# Patient Record
Sex: MALE | Race: WHITE | Employment: OTHER | URBAN - METROPOLITAN AREA
[De-identification: names, ages, dates, MRNs, and addresses within clinical notes are randomized per-mention and may not be internally consistent; named-entity substitution may affect disease eponyms.]

---

## 2017-01-26 RX ORDER — CLOPIDOGREL BISULFATE 75 MG/1
75 TABLET ORAL DAILY
Qty: 90 TAB | Refills: 3 | Status: SHIPPED | OUTPATIENT
Start: 2017-01-26 | End: 2018-02-15 | Stop reason: SDUPTHER

## 2017-01-26 RX ORDER — DESMOPRESSIN ACETATE 0.2 MG/1
0.2 TABLET ORAL AS NEEDED
Qty: 30 TAB | Refills: 3 | Status: SHIPPED | OUTPATIENT
Start: 2017-01-26 | End: 2017-02-23 | Stop reason: SDUPTHER

## 2017-02-16 RX ORDER — AZITHROMYCIN 250 MG/1
TABLET, FILM COATED ORAL
Qty: 6 TAB | Refills: 0 | Status: SHIPPED | OUTPATIENT
Start: 2017-02-16 | End: 2017-07-18 | Stop reason: ALTCHOICE

## 2017-02-23 ENCOUNTER — OFFICE VISIT (OUTPATIENT)
Dept: CARDIOLOGY CLINIC | Age: 72
End: 2017-02-23

## 2017-02-23 ENCOUNTER — CLINICAL SUPPORT (OUTPATIENT)
Dept: CARDIOLOGY CLINIC | Age: 72
End: 2017-02-23

## 2017-02-23 VITALS
HEART RATE: 79 BPM | WEIGHT: 240 LBS | DIASTOLIC BLOOD PRESSURE: 104 MMHG | HEIGHT: 71 IN | OXYGEN SATURATION: 96 % | SYSTOLIC BLOOD PRESSURE: 140 MMHG | BODY MASS INDEX: 33.6 KG/M2

## 2017-02-23 DIAGNOSIS — Z95.0 S/P BIVENTRICULAR CARDIAC PACEMAKER PROCEDURE: ICD-10-CM

## 2017-02-23 DIAGNOSIS — I25.10 ATHEROSCLEROSIS OF NATIVE CORONARY ARTERY OF NATIVE HEART WITHOUT ANGINA PECTORIS: Primary | ICD-10-CM

## 2017-02-23 DIAGNOSIS — E78.49 OTHER HYPERLIPIDEMIA: ICD-10-CM

## 2017-02-23 DIAGNOSIS — I44.2 THIRD DEGREE AV BLOCK (HCC): Primary | ICD-10-CM

## 2017-02-23 DIAGNOSIS — Z95.0 CARDIAC PACEMAKER IN SITU: ICD-10-CM

## 2017-02-23 NOTE — MR AVS SNAPSHOT
Visit Information Date & Time Provider Department Dept. Phone Encounter #  
 2/23/2017  1:00 PM Aliyah Peng MD [facility] [address] [id number] Your Appointments 5/25/2017  1:20 PM  
Follow Up with Aliyah Peng MD  
[facility] ([facility]) Appt Note: 3 mth f/up with Dr. Tonie Reagan [phone] [address] [address] [phone] [facility] [address] Upcoming Health Maintenance Date Due DTaP/Tdap/Td series (1 - Tdap) 10/31/1964 FOBT Q 1 YEAR AGE 50-75 10/31/1993 ZOSTER VACCINE AGE 60> 10/31/2003 GLAUCOMA SCREENING Q2Y 10/31/2008 MEDICARE YEARLY EXAM 10/31/2008 Pneumococcal 65+ Low/Medium Risk (2 of 2 - PPSV23) 1/1/2011 INFLUENZA AGE 9 TO ADULT 8/1/2016 Allergies as of 2/23/2017  Review Complete On: 3/21/2015 By: Aliyah Peng MD  
 No Known Allergies Current Immunizations  Reviewed on 12/17/2013 Name Date Pneumococcal Vaccine (Unspecified Type) 1/1/2006 Not reviewed this visit You Were Diagnosed With   
  
 Codes Comments Atherosclerosis of native coronary artery of native heart without angina pectoris    -  Primary ICD-10-CM: I25.10 ICD-9-CM: 414.01 Vitals BP  
  
  
  
  
  
 (!) 140/104 (BP 1 Location: Right arm, BP Patient Position: Sitting) BMI and BSA Data Body Mass Index Body Surface Area  
 33.47 kg/m 2 2.34 m 2 Preferred Pharmacy Pharmacy Name Phone [facility] [place], [facility] Antoni Cullene. [phone] Your Updated Medication List  
  
   
This list is accurate as of: 2/23/17  2:20 PM.  Always use your most recent med list.  
  
  
  
  
 ASCORBYL PALMITATE (BULK)  
by Does Not Apply route. aspirin 81 mg tablet Take 162 mg by mouth daily. azithromycin 250 mg tablet Commonly known as:  [place]ie 2 tabs PO now then 1 tab daily for 4 days Cholecalciferol (Vitamin D3) 3,000 unit Tab Take 400 Units by mouth daily. clopidogrel 75 mg Tab Commonly known as:  PLAVIX Take 1 Tab by mouth daily. COQ10  100-100 mg-unit Cap Generic drug:  coenzyme q10-vitamin e Take  by mouth two (2) times a day. CURCUMIN Take 1 Cap by mouth two (2) times a day. * desmopressin 0.1 mg tablet Commonly known as:  DDAVP Take 1 tablet by mouth daily. * desmopressin 0.2 mg tablet Commonly known as:  DDAVP Take 1 Tab by mouth as needed. As needed during trips   Indications: PARTIAL CENTRAL DIABETES INSIPIDUS  
  
 DHEA PO Take 50 mg by mouth daily. HYDROcodone-acetaminophen 5-325 mg per tablet Commonly known as:  Mark Bent Take 1 Tab by mouth every six (6) hours as needed for Pain.  
  
 melatonin 3 mg tablet Take 3 mg by mouth nightly. Omega-3-DHA-EPA-Fish Oil 1,000 mg (120 mg-180 mg) Cap Take  by mouth daily. SELENIUM PO Take 1 Cap by mouth every Monday, Wednesday, Friday. * testosterone 30 mg/actuation (1.5 mL) Slpm  
Commonly known as:  AXIRON  
2 Actuation(s) by TransDERmal route daily. * testosterone 4 mg/24 hr Pt24 Commonly known as:  ANDRODERM 1 patch by TransDERmal route daily. VITAMIN C 500 mg tablet Generic drug:  ascorbic acid (vitamin C) Take 500 mg by mouth daily. vitamin e 400 unit Tab Take  by mouth daily. * Notice: This list has 4 medication(s) that are the same as other medications prescribed for you. Read the directions carefully, and ask your doctor or other care provider to review them with you. We Performed the Following AMB POC EKG ROUTINE W/ 12 LEADS, INTER & REP [02825 CPT(R)] To-Do List   
 02/23/2017 Lab:  LIPID PANEL   
  
 02/23/2017 Lab:  METABOLIC PANEL, [facility] & HEALTH SERVICES! Dear Goyo Guerrero: Thank you for requesting a Storone account. Our records indicate that you already have an active Storone account. You can access your account anytime at [email or web address] of the Elements. WalkHub/Children of the Elements Did you know that you can access your hospital and ER discharge instructions at any time in Storone? You can also review all of your test results from your hospital stay or ER visit. Additional Information If you have questions, please visit the Frequently Asked Questions section of the Storone website at [email or web address] [email or web address] [employer] [email or web address] WalkHub/Children of the Elements/. Remember, Storone is NOT to be used for urgent needs. For medical emergencies, dial 911. Now available from your iPhone and Android! Please provide this summary of care documentation to your next provider. Your primary care clinician is listed as Jennifer MCWILLIAMS. If you have any questions after today's visit, please call [phone].

## 2017-02-23 NOTE — PROGRESS NOTES
1. Have you been to the ER, urgent care clinic since your last visit? Hospitalized since your last visit? No    2. Have you seen or consulted any other health care providers outside of the [address] [place] [address] since your last visit? Include any pap smears or colon screening.  No    Verbal order and read back per Yvonne Martínez MD

## 2017-02-24 RX ORDER — DESMOPRESSIN ACETATE 0.2 MG/1
0.2 TABLET ORAL AS NEEDED
Qty: 30 TAB | Refills: 3 | Status: SHIPPED | OUTPATIENT
Start: 2017-02-24 | End: 2017-04-21 | Stop reason: DRUGHIGH

## 2017-02-24 NOTE — PROGRESS NOTES
I have personally seen and evaluated the device findings. Interrogation reviewed and I agree with assessment.     Ayanna Chandra

## 2017-02-24 NOTE — PROGRESS NOTES
PATIENT NAME: Rosanne Masters         66 y.o.      1945              DATE:2/23/2017    REASON FOR VISIT: Coronary artery disease    HISTORY OF PRESENT ILLNESS:Denies chest pain and other symptoms suggestive of angina. Denies MUSA, PND, orthopnea. Denies palpitation, syncope, presyncope. Denies edema in the lower extremities. 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 act*  No date: Arthritis  7/13/2013: Ataxia  No date: Ataxia, late effect of cerebrovascular disease  No date: CAD (coronary artery disease)  7/31/2013: Carotid artery disease ([address]  7/13/2013: Carotid bruit  No date: Cognitive deficits, late effect of cerebrovasc*  6/14/2013: Complete heart block ([address]  6/14/2013: Complete heart block ([address]  6/14/2013: Coronary atherosclerosis of native coronary ar*  2014: Fractured rib  2/20/2014: Hypogonadism male  7/13/2013: Normal pressure hydrocephalus  No date: Other testicular hypofunction  No date: Pacemaker  7/29/2013: Pacemaker malfunction  7/13/2013: Peripheral neuropathy ([address]  7/13/2013: S/P ventriculoperitoneal shunt  12/16/2013:  Third degree AV block ([address]  7/31/2013: Transient ischemic attack  7/31/2013: Transient ischemic attack  No date: Unspecified disorder of muscle, ligament, and *  No date: Unspecified hereditary and idiopathic peripher*  No date: Unspecified intestinal malabsorption    PAST SURGICAL HISTORY:   Past Surgical History:  10/2002: BRAIN SHUNT TUBE/RESERV INJECTN      Comment: [facility]  No date: HX APPENDECTOMY      Comment: [age] years old  2003: HX CORONARY ARTERY BYPASS GRAFT      Comment: Dr. Elijah Pacheco  2008: HX HERNIA REPAIR      Comment: abdominal a couple   No date: HX PACEMAKER  12/16/2013: [facility] REMOVAL PERMANENT PACEMAKER PULSE GENERATOR*      Comment:    12/16/2013: REMOVAL PACER LEADS DUAL      Comment:        SOCIAL HISTORY:  Social History    Marital status:             Spouse name: Years of education:                 Number of children:               Social History Main Topics    Smoking status: Never Smoker                                                                Smokeless status: Never Used                        Alcohol use: Yes           7.0 oz/week    Drug use: No              Sexual activity: Not Currently        Social History Narrative    ** Merged History Encounter **             ALLERGIES:   No Known Allergies     CURRENT MEDICATIONS:   Current Outpatient Prescriptions:  desmopressin (DDAVP) 0.2 mg tablet, Take 1 Tab by mouth as needed. As needed during trips   Indications: PARTIAL CENTRAL DIABETES INSIPIDUS  azithromycin (ZITHROMAX) 250 mg tablet, 2 tabs PO now then 1 tab daily for 4 days  clopidogrel (PLAVIX) 75 mg tab, Take 1 Tab by mouth daily. Cholecalciferol, Vitamin D3, 3,000 unit tab, Take 400 Units by mouth daily. desmopressin (DDAVP) 0.1 mg tablet, Take 1 tablet by mouth daily. testosterone (ANDRODERM) 4 mg/24 hr pt24, 1 patch by TransDERmal route daily. testosterone (AXIRON) 30 mg/1.5 mL /actuation slpm, 2 Actuation(s) by TransDERmal route daily. HYDROcodone-acetaminophen (NORCO) 5-325 mg per tablet, Take 1 Tab by mouth every six (6) hours as needed for Pain.  vitamin e 400 unit Tab, Take  by mouth daily. aspirin 81 mg tablet, Take 162 mg by mouth daily. Omega-3-DHA-EPA-Fish Oil 1,000 (120-180) mg Cap, Take  by mouth daily. melatonin 3 mg tablet, Take 3 mg by mouth nightly. ascorbic acid (VITAMIN C) 500 mg tablet, Take 500 mg by mouth daily. coenzyme q10-vitamin e (COQ10 ) 100-100 mg-unit Cap, Take  by mouth two (2) times a day. SELENIUM PO, Take 1 Cap by mouth every Monday, Wednesday, Friday. ASCORBYL PALMITATE, BULK,, by Does Not Apply route. PRASTERONE, DHEA, (DHEA PO), Take 50 mg by mouth daily. TURMERIC (CURCUMIN), Take 1 Cap by mouth two (2) times a day. No current facility-administered medications for this visit.        REVIEW of SYSTEMS:History obtained from chart review and the patient  General ROS: negative for - weight gain or weight loss  Respiratory ROS: Please see history of present illness  Cardiovascular ROS: Please see history of present illness     PHYSICAL EXAMINATION:   BP (!) 140/104 (BP 1 Location: Right arm, BP Patient Position: Sitting)  Pulse 79  Ht 5' 11\" (1.803 m)  Wt 108.9 kg (240 lb)  SpO2 96%  BMI 33.47 kg/m2  BP Readings from Last 3 Encounters:  02/23/17 : (!) 140/104  11/25/16 : 126/86  03/02/15 : 137/90    Pulse Readings from Last 3 Encounters:  02/23/17 : 79  11/25/16 : 80  03/02/15 : 85    Wt Readings from Last 3 Encounters:  02/23/17 : 108.9 kg (240 lb)  11/25/16 : 105.7 kg (233 lb)  03/02/15 : 102.1 kg (225 lb)    Neck: No jugular venous distention. No bruits. Chest: Clear to auscultation. Heart: Regular rhythm. No gallop audible. Extremities: Trace edema    PACEMAKER INTERROGATION: Normal function. Good battery life    IMPRESSION:   Coronary artery disease, asymptomatic status post coronary artery bypass surgery  Hyperlipidemia  Normally functioning pacemaker    PLAN:  Lipid profile. Comprehensive metabolic profile    The diagnoses and plan were discussed with patient. All questions answered. Plan of care agreed to by all concerned. Alison Reddy.  MD Aleshia       ,

## 2017-04-21 ENCOUNTER — OFFICE VISIT (OUTPATIENT)
Dept: CARDIOLOGY CLINIC | Age: 72
End: 2017-04-21

## 2017-04-21 DIAGNOSIS — G91.2 NORMAL PRESSURE HYDROCEPHALUS (HCC): ICD-10-CM

## 2017-04-21 DIAGNOSIS — I77.9 CAROTID ARTERY DISEASE, UNSPECIFIED LATERALITY (HCC): ICD-10-CM

## 2017-04-21 DIAGNOSIS — I25.10 ATHEROSCLEROSIS OF NATIVE CORONARY ARTERY OF NATIVE HEART WITHOUT ANGINA PECTORIS: ICD-10-CM

## 2017-04-21 RX ORDER — DESMOPRESSIN ACETATE 0.2 MG/1
0.2 TABLET ORAL
Qty: 90 TAB | Refills: 3 | Status: SHIPPED | OUTPATIENT
Start: 2017-04-21 | End: 2018-02-12

## 2017-04-21 NOTE — MR AVS SNAPSHOT
Visit Information Date & Time Provider Department Dept. Phone Encounter #  
 4/21/2017  8:40 AM Alyssa Schmitt MD [facility] [address] [phone] Your Appointments 8/3/2017  2:00 PM  
Follow Up with Alyssa Schmitt MD  
[facility] [address] ([address]) [place] [phone] [address] [address] [phone] [address] [place] [address] [address] Upcoming Health Maintenance Date Due DTaP/Tdap/Td series (1 - Tdap) 10/31/1964 FOBT Q 1 YEAR AGE 50-75 10/31/1993 ZOSTER VACCINE AGE 60> 10/31/2003 GLAUCOMA SCREENING Q2Y 10/31/2008 MEDICARE YEARLY EXAM 10/31/2008 Pneumococcal 65+ Low/Medium Risk (2 of 2 - PPSV23) 1/1/2011 INFLUENZA AGE 9 TO ADULT 8/1/2016 Allergies as of 4/21/2017  Review Complete On: 4/21/2017 By: Celeste Fernandes LPN No Known Allergies Current Immunizations  Reviewed on 12/17/2013 Name Date Pneumococcal Vaccine (Unspecified Type) 1/1/2006 Not reviewed this visit You Were Diagnosed With   
  
 Codes Comments H/O ventricular shunt    -  Primary ICD-10-CM: Z98.2 ICD-9-CM: V45.2 Carotid artery disease, unspecified laterality ([facility]     ICD-10-CM: I77.9 ICD-9-CM: 068. 9 Vitals Smoking Status Never Smoker Preferred Pharmacy Pharmacy Name Phone [facility] [place] [address] [address] [phone] Your Updated Medication List  
  
   
This list is accurate as of: 4/21/17 10:08 AM.  Always use your most recent med list.  
  
  
  
  
 ASCORBYL PALMITATE (BULK)  
by Does Not Apply route. aspirin 81 mg tablet Take 162 mg by mouth daily. azithromycin 250 mg tablet Commonly known as:  Mansi Brown 2 tabs PO now then 1 tab daily for 4 days Cholecalciferol (Vitamin D3) 3,000 unit Tab Take 400 Units by mouth daily. clopidogrel 75 mg Tab Commonly known as:  PLAVIX Take 1 Tab by mouth daily. COQ10  100-100 mg-unit Cap Generic drug:  coenzyme q10-vitamin e Take  by mouth two (2) times a day. CURCUMIN Take 1 Cap by mouth two (2) times a day. desmopressin 0.2 mg tablet Commonly known as:  DDAVP Take 1 Tab by mouth nightly. Indications: PARTIAL CENTRAL DIABETES INSIPIDUS  
  
 DHEA PO Take 50 mg by mouth daily. HYDROcodone-acetaminophen 5-325 mg per tablet Commonly known as:  Marlaine Rakesh Take 1 Tab by mouth every six (6) hours as needed for Pain.  
  
 melatonin 3 mg tablet Take 3 mg by mouth nightly. Omega-3-DHA-EPA-Fish Oil 1,000 mg (120 mg-180 mg) Cap Take  by mouth daily. SELENIUM PO Take 1 Cap by mouth every Monday, Wednesday, Friday. * testosterone 30 mg/actuation (1.5 mL) Slpm  
Commonly known as:  AXIRON  
2 Actuation(s) by TransDERmal route daily. * testosterone 4 mg/24 hr Pt24 Commonly known as:  ANDRODERM 1 patch by TransDERmal route daily. VITAMIN C 500 mg tablet Generic drug:  ascorbic acid (vitamin C) Take 500 mg by mouth daily. vitamin e 400 unit Tab Take  by mouth daily. * Notice: This list has 2 medication(s) that are the same as other medications prescribed for you. Read the directions carefully, and ask your doctor or other care provider to review them with you. Prescriptions Printed Refills  
 desmopressin (DDAVP) 0.2 mg tablet 3 Sig: Take 1 Tab by mouth nightly. Indications: PARTIAL CENTRAL DIABETES INSIPIDUS Class: Print Route: Oral  
  
We Performed the Following AMB POC EKG ROUTINE W/ 12 LEADS, INTER & REP [01594 CPT(R)] Introducing [facility] & HEALTH SERVICES! Dear Alex Dillon: Thank you for requesting a ZenMate account. Our records indicate that you already have an active ZenMate account.   You can access your account anytime at [email or web address]. [employer]ED [employer]FORMATION [employer]ITY/Sandlot Solutions Did you know that you can access your hospital and ER discharge instructions at any time in Clifton? You can also review all of your test results from your hospital stay or ER visit. Additional Information If you have questions, please visit the Frequently Asked Questions section of the Clifton website at [email or web address]. [employer]ED [employer]FORMATION [employer]URITY/[employer]/. Remember, Clifton is NOT to be used for urgent needs. For medical emergencies, dial 911. Now available from your iPhone and Android! Please provide this summary of care documentation to your next provider. Your primary care clinician is listed as [facility] MCWILLIAMS. If you have any questions after today's visit, please call [phone].

## 2017-04-24 NOTE — PROGRESS NOTES
The patient is here for a pacemaker check only. The pacemaker is functioning normally. He has requested a referral to a neurosurgeon at the [facility].   Dr. Cristian Dominguez.  I will arrange for the referral

## 2017-04-26 ENCOUNTER — TELEPHONE (OUTPATIENT)
Dept: CARDIOLOGY CLINIC | Age: 72
End: 2017-04-26

## 2017-04-26 NOTE — TELEPHONE ENCOUNTER
Patient is calling for records to be sent to Dr. Barney Mccall PH# [phone]. Dr. Scottie Helms is suppose to be referring him ?

## 2017-06-26 ENCOUNTER — TELEPHONE (OUTPATIENT)
Dept: CARDIOLOGY CLINIC | Age: 72
End: 2017-06-26

## 2017-06-26 NOTE — TELEPHONE ENCOUNTER
Patient scheduled appointment with the [facility] on the 14th July, not with cardiovascular specialists office.

## 2017-06-26 NOTE — TELEPHONE ENCOUNTER
Patients wants a referral for a vascular problems to [facility]. Patient has history of TIA. Patient scheduled appointment on 14th July.    [address] [phone]

## 2017-06-29 RX ORDER — AZITHROMYCIN 250 MG/1
250 TABLET, FILM COATED ORAL SEE ADMIN INSTRUCTIONS
Qty: 6 TAB | Refills: 0 | Status: SHIPPED | OUTPATIENT
Start: 2017-06-29 | End: 2017-07-04

## 2017-06-30 ENCOUNTER — TELEPHONE (OUTPATIENT)
Dept: CARDIOLOGY CLINIC | Age: 72
End: 2017-06-30

## 2017-06-30 NOTE — TELEPHONE ENCOUNTER
Office Visit     4/21/2017  [facility] Lb Naylor MD   Cardiology    H/O ventricular shunt +3 more   Dx    Cholesterol Problem   Reason for visit    Progress Notes      The patient is here for a pacemaker check only. The pacemaker is functioning normally. He has requested a referral to a neurosurgeon at the [facility].   Dr. Kimberly Sosa.  I will arrange for the referral

## 2017-06-30 NOTE — TELEPHONE ENCOUNTER
Last tie he was seen he was given referral to see neurosurgeon. Neurosurgeon wend over mri and advised him that it is not a spinal stenosis that they suspected, but vascular issue. He advised him to be seen by Sammy Gonzalez MD (vascular surgeon over at [address] [place] [address] ).    Patient already scheduled appointment with him on 13 th july

## 2017-07-10 ENCOUNTER — TELEPHONE (OUTPATIENT)
Dept: CARDIOLOGY CLINIC | Age: 72
End: 2017-07-10

## 2017-07-10 NOTE — TELEPHONE ENCOUNTER
Araceli Apple @ [facility] called requesting information on patient's testing for Carotid Stenosis and PVD. She stated that patient has an appointment coming up with them. I spoke with Imani Orr about this . Rubin Stoll It appears that the patient requested Dr. Mary Jo Maldonado to refer him to a Neurologist @ [facility]. When he saw that doctor . Rubin Stoll He stated that the patient's problem was vascular . Rubin Stoll Which led to the patient being scheduled with [facility]. Imani Orr stated that whatever testing was done will need to be obtained from the Neurologist up there. I called back and left a voicemail on Roseline's direct line with that information. Also told her that if she had any further questions for us or if she needed anything else from us . Rubin Stoll To please call us back. Roseline's contact #s are: PT#[phone]  SFO#[phone].

## 2017-07-18 PROBLEM — Z80.42 FAMILY HISTORY OF PROSTATE CANCER: Status: ACTIVE | Noted: 2017-07-18

## 2017-07-18 PROBLEM — E23.2 DIABETES INSIPIDUS (HCC): Status: ACTIVE | Noted: 2017-07-18

## 2017-08-03 ENCOUNTER — CLINICAL SUPPORT (OUTPATIENT)
Dept: CARDIOLOGY CLINIC | Age: 72
End: 2017-08-03

## 2017-08-03 ENCOUNTER — OFFICE VISIT (OUTPATIENT)
Dept: CARDIOLOGY CLINIC | Age: 72
End: 2017-08-03

## 2017-08-03 VITALS
HEART RATE: 73 BPM | DIASTOLIC BLOOD PRESSURE: 92 MMHG | SYSTOLIC BLOOD PRESSURE: 142 MMHG | WEIGHT: 244 LBS | OXYGEN SATURATION: 96 % | BODY MASS INDEX: 36.14 KG/M2 | HEIGHT: 69 IN

## 2017-08-03 DIAGNOSIS — Z95.0 CARDIAC PACEMAKER IN SITU: ICD-10-CM

## 2017-08-03 DIAGNOSIS — I25.10 ATHEROSCLEROSIS OF NATIVE CORONARY ARTERY OF NATIVE HEART WITHOUT ANGINA PECTORIS: Primary | ICD-10-CM

## 2017-08-03 DIAGNOSIS — I44.2 THIRD DEGREE AV BLOCK (HCC): Primary | ICD-10-CM

## 2017-08-03 NOTE — PATIENT INSTRUCTIONS
Continue current medications. No changes.    If you have any further questions or concerns, please contact our office. [phone]

## 2017-08-03 NOTE — MR AVS SNAPSHOT
Visit Information Date & Time Provider Department Dept. Phone Encounter #  
 8/3/2017  2:00 PM Gael Rogers MD [facility] \[facility]\"" [phone] Upcoming Health Maintenance Date Due DTaP/Tdap/Td series (1 - Tdap) 10/31/1964 FOBT Q 1 YEAR AGE 50-75 10/31/1993 ZOSTER VACCINE AGE 60> 8/31/2003 GLAUCOMA SCREENING Q2Y 10/31/2008 MEDICARE YEARLY EXAM 10/31/2008 Pneumococcal 65+ Low/Medium Risk (2 of 2 - PPSV23) 1/1/2011 INFLUENZA AGE 9 TO ADULT 8/1/2017 Allergies as of 8/3/2017  Review Complete On: 8/3/2017 By: Rupal Kerns No Known Allergies Current Immunizations  Reviewed on 12/17/2013 Name Date Pneumococcal Vaccine (Unspecified Type) 1/1/2006 Not reviewed this visit You Were Diagnosed With   
  
 Codes Comments Atherosclerosis of native coronary artery of native heart without angina pectoris    -  Primary ICD-10-CM: I25.10 ICD-9-CM: 414.01 Vitals BP Pulse Height(growth percentile) Weight(growth percentile) SpO2 BMI  
 (!) 142/92 (BP 1 Location: Left arm, BP Patient Position: Sitting) 73 5' 9\" (1.753 m) 244 lb (110.7 kg) 96% 36.03 kg/m2 Smoking Status Never Smoker Vitals History BMI and BSA Data Body Mass Index Body Surface Area 36.03 kg/m 2 2.32 m 2 Preferred Pharmacy Pharmacy Name Phone [facility] [place] Antoni Bermane. [phone] Your Updated Medication List  
  
   
This list is accurate as of: 8/3/17  3:10 PM.  Always use your most recent med list.  
  
  
  
  
 ASCORBYL PALMITATE (BULK)  
by Does Not Apply route. aspirin 81 mg tablet Take 162 mg by mouth daily. Cholecalciferol (Vitamin D3) 3,000 unit Tab Take 400 Units by mouth daily. clopidogrel 75 mg Tab Commonly known as:  PLAVIX Take 1 Tab by mouth daily. COQ10  100-100 mg-unit Cap Generic drug:  coenzyme q10-vitamin e Take  by mouth two (2) times a day. CURCUMIN Take 1 Cap by mouth two (2) times a day. desmopressin 0.2 mg tablet Commonly known as:  DDAVP Take 1 Tab by mouth nightly. Indications: PARTIAL CENTRAL DIABETES INSIPIDUS  
  
 DHEA PO Take 50 mg by mouth daily. melatonin 3 mg tablet Take 3 mg by mouth nightly. Omega-3-DHA-EPA-Fish Oil 1,000 mg (120 mg-180 mg) Cap Take  by mouth daily. SELENIUM PO Take 1 Cap by mouth every Monday, Wednesday, Friday. * testosterone 30 mg/actuation (1.5 mL) Slpm  
Commonly known as:  AXIRON  
2 Actuation(s) by TransDERmal route daily. * testosterone 20.25 mg/1.25 gram (1.62 %) gel Commonly known as:  ANDROGEL Apply 1 Spray to affected area daily. Max Daily Amount: 20.25 mg. 2 pumps daily VITAMIN C 500 mg tablet Generic drug:  ascorbic acid (vitamin C) Take 500 mg by mouth daily. vitamin e 400 unit Tab Take  by mouth daily. * Notice: This list has 2 medication(s) that are the same as other medications prescribed for you. Read the directions carefully, and ask your doctor or other care provider to review them with you. We Performed the Following AMB POC EKG ROUTINE W/ 12 LEADS, INTER & REP [45457 CPT(R)] Patient Instructions Continue current medications. No changes. If you have any further questions or concerns, please contact our office. [phone] \[facility]\"" & HEALTH SERVICES! Dear Fer Ny: Thank you for requesting a Salesforce account. Our records indicate that you already have an active Salesforce account. You can access your account anytime at [email or web address]. [employer] Life Electronic Cigarette/Kotak Urja Did you know that you can access your hospital and ER discharge instructions at any time in Salesforce? You can also review all of your test results from your hospital stay or ER visit. Additional Information If you have questions, please visit the Frequently Asked Questions section of the [employer]hart website at [email or web address]. [email or web address] Remember, [employer] is NOT to be used for urgent needs. For medical emergencies, dial 911. Now available from your iPhone and Android! Please provide this summary of care documentation to your next provider. Your primary care clinician is listed as Mandy MCWILLIAMS. If you have any questions after today's visit, please call [phone].

## 2017-08-03 NOTE — PROGRESS NOTES
1. Have you been to the ER, urgent care clinic since your last visit? Hospitalized since your last visit? [facility] vascular surgeon consult   2. Have you seen or consulted any other health care providers outside of the [address] [place] [address] since your last visit? Include any pap smears or colon screening.   no

## 2017-08-04 NOTE — PROGRESS NOTES
PATIENT NAME: Alida Mccauley         66 y.o.      1945              DATE:8/3/2017    REASON FOR VISIT: Coronary artery disease, pacemaker    HISTORY OF PRESENT ILLNESS:Denies chest pain and other symptoms suggestive of angina. Denies MUSA, PND, orthopnea. Denies palpitation, syncope, presyncope. Denies edema in the lower extremities. 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 act*  No date: Arthritis  7/13/2013: Ataxia  No date: Ataxia, late effect of cerebrovascular disease  No date: CAD (coronary artery disease)  7/31/2013: Carotid artery disease ([address]  7/13/2013: Carotid bruit  No date: Cognitive deficits, late effect of cerebrovasc*  6/14/2013: Complete heart block ([address]  6/14/2013: Complete heart block ([address]  6/14/2013: Coronary atherosclerosis of native coronary ar*  7/18/2017: Diabetes insipidus ([address]  2014: Fractured rib  2/20/2014: Hypogonadism male  7/13/2013: Normal pressure hydrocephalus  No date: Other testicular hypofunction  No date: Pacemaker  7/29/2013: Pacemaker malfunction  7/13/2013: Peripheral neuropathy ([address]  7/13/2013: S/P ventriculoperitoneal shunt  12/16/2013:  Third degree AV block ([address]  7/31/2013: Transient ischemic attack  7/31/2013: Transient ischemic attack  No date: Unspecified disorder of muscle, ligament, and *  No date: Unspecified hereditary and idiopathic peripher*  No date: Unspecified intestinal malabsorption    PAST SURGICAL HISTORY:   Past Surgical History:  10/2002: BRAIN SHUNT TUBE/RESERV INJECTN      Comment: [facility]  No date: HX APPENDECTOMY      Comment: [age] years old  2003: HX CORONARY ARTERY BYPASS GRAFT      Comment: Dr. Debbie Hernandes  2008: HX HERNIA REPAIR      Comment: abdominal a couple   No date: HX PACEMAKER  12/16/2013: [place] REMOVAL PERMANENT PACEMAKER PULSE GENERATOR*      Comment:    12/16/2013: REMOVAL PACER LEADS DUAL      Comment:        SOCIAL HISTORY:  Social History    Marital status:             Spouse name:                       Years of education:                 Number of children:               Social History Main Topics    Smoking status: Never Smoker                                                                Smokeless status: Never Used                        Alcohol use: Yes           7.0 oz/week    Drug use: No              Sexual activity: Not Currently        Social History Narrative    ** Merged History Encounter **             ALLERGIES:   No Known Allergies     CURRENT MEDICATIONS:   Current Outpatient Prescriptions:  testosterone (ANDROGEL) 20.25 mg/1.25 gram (1.62 %) gel, Apply 1 Spray to affected area daily. Max Daily Amount: 20.25 mg. 2 pumps daily  desmopressin (DDAVP) 0.2 mg tablet, Take 1 Tab by mouth nightly. Indications: PARTIAL CENTRAL DIABETES INSIPIDUS  clopidogrel (PLAVIX) 75 mg tab, Take 1 Tab by mouth daily. Cholecalciferol, Vitamin D3, 3,000 unit tab, Take 400 Units by mouth daily. testosterone (AXIRON) 30 mg/1.5 mL /actuation slpm, 2 Actuation(s) by TransDERmal route daily. vitamin e 400 unit Tab, Take  by mouth daily. aspirin 81 mg tablet, Take 162 mg by mouth daily. Omega-3-DHA-EPA-Fish Oil 1,000 (120-180) mg Cap, Take  by mouth daily. melatonin 3 mg tablet, Take 3 mg by mouth nightly. ascorbic acid (VITAMIN C) 500 mg tablet, Take 500 mg by mouth daily. coenzyme q10-vitamin e (COQ10 ) 100-100 mg-unit Cap, Take  by mouth two (2) times a day. SELENIUM PO, Take 1 Cap by mouth every Monday, Wednesday, Friday. ASCORBYL PALMITATE, BULK,, by Does Not Apply route. PRASTERONE, DHEA, (DHEA PO), Take 50 mg by mouth daily. TURMERIC (CURCUMIN), Take 1 Cap by mouth two (2) times a day. No current facility-administered medications for this visit.        REVIEW of SYSTEMS:General ROS: 11 pounds weight gain since last visit  Cardiovascular: Please see history of present illness     PHYSICAL EXAMINATION:   BP (!) 142/92 (BP 1 Location: Left arm, BP Patient Position: Sitting)  Pulse 73  Ht 5' 9\" (1.753 m)  Wt 110.7 kg (244 lb)  SpO2 96%  BMI 36.03 kg/m2  BP Readings from Last 3 Encounters:  08/03/17 : (!) 142/92  07/18/17 : 130/72  02/23/17 : (!) 140/104    Pulse Readings from Last 3 Encounters:  08/03/17 : 73  02/23/17 : 79  11/25/16 : 80    Wt Readings from Last 3 Encounters:  08/03/17 : 110.7 kg (244 lb)  07/18/17 : 105.7 kg (233 lb)  02/23/17 : 108.9 kg (240 lb)    : Obese white male no apparent distress. Neck: No jugular venous distention. Chest: Clear to auscultation. Heart: Regular rhythm. No murmur or gallop. Extremities: 2-3+ edema left lower extremity 2+ edema right lower extremity    Medical interrogation: Normal function. No significant atrial high rates    IMPRESSION:   Coronary artery disease, asymptomatic status post coronary artery bypass surgery  Normally functioning permanent pacemaker    PLAN:  I have offered the patient a diuretic because of his weight gain and edema. He refuses. The diagnoses and plan were discussed with patient. All questions answered. Plan of care agreed to by all concerned. Naveed Payne.  MD Aleshia       ,

## 2017-08-06 NOTE — PROGRESS NOTES
I have personally seen and evaluated the device findings. Interrogation reviewed and I agree with assessment.     Aston Harrison

## 2017-09-26 ENCOUNTER — TELEPHONE (OUTPATIENT)
Dept: CARDIOLOGY CLINIC | Age: 72
End: 2017-09-26

## 2017-09-26 RX ORDER — DESMOPRESSIN ACETATE 0.2 MG/1
TABLET ORAL
Qty: 30 TAB | Refills: 3 | Status: SHIPPED | OUTPATIENT
Start: 2017-09-26 | End: 2018-02-08 | Stop reason: SDUPTHER

## 2017-09-26 NOTE — TELEPHONE ENCOUNTER
Patient calling in stating that he thinks he have otitis externa. patient is a retired doctor. Patient would like to know which ENT dr Phil Ko will recommend for him to go.

## 2017-09-27 NOTE — TELEPHONE ENCOUNTER
Left a message informing the patient the only ENT specialist Dr. Megan Blevins is aware of is in [place]. He was asked to call the office back if he has further questions.  Yaakov Grissom LPN

## 2017-12-06 ENCOUNTER — OFFICE VISIT (OUTPATIENT)
Dept: CARDIOLOGY CLINIC | Age: 72
End: 2017-12-06

## 2017-12-06 ENCOUNTER — HOSPITAL ENCOUNTER (OUTPATIENT)
Dept: LAB | Age: 72
Discharge: HOME | End: 2017-12-06
Payer: MEDICARE

## 2017-12-06 VITALS
SYSTOLIC BLOOD PRESSURE: 130 MMHG | HEART RATE: 61 BPM | OXYGEN SATURATION: 98 % | HEIGHT: 69 IN | WEIGHT: 241 LBS | BODY MASS INDEX: 35.7 KG/M2 | DIASTOLIC BLOOD PRESSURE: 86 MMHG

## 2017-12-06 DIAGNOSIS — I10 ESSENTIAL (PRIMARY) HYPERTENSION: ICD-10-CM

## 2017-12-06 DIAGNOSIS — I25.10 ATHEROSCLEROSIS OF NATIVE CORONARY ARTERY OF NATIVE HEART WITHOUT ANGINA PECTORIS: ICD-10-CM

## 2017-12-06 DIAGNOSIS — I73.9 PAD (PERIPHERAL ARTERY DISEASE) (HCC): ICD-10-CM

## 2017-12-06 DIAGNOSIS — R79.9 ABNORMAL FINDING OF BLOOD CHEMISTRY: ICD-10-CM

## 2017-12-06 DIAGNOSIS — I44.2 COMPLETE HEART BLOCK (HCC): Primary | ICD-10-CM

## 2017-12-06 DIAGNOSIS — E78.5 HYPERLIPIDEMIA, UNSPECIFIED HYPERLIPIDEMIA TYPE: ICD-10-CM

## 2017-12-06 LAB
ALBUMIN SERPL-MCNC: 3.9 G/DL (ref 3.4–5)
ALBUMIN/GLOB SERPL: 1.2 {RATIO} (ref 0.8–1.7)
ALP SERPL-CCNC: 92 U/L (ref 45–117)
ALT SERPL-CCNC: 31 U/L (ref 16–61)
ANION GAP SERPL CALC-SCNC: 10 MMOL/L (ref 3–18)
AST SERPL-CCNC: 20 U/L (ref 15–37)
BASOPHILS # BLD: 0 K/UL (ref 0–0.06)
BASOPHILS NFR BLD: 0 % (ref 0–2)
BILIRUB SERPL-MCNC: 0.5 MG/DL (ref 0.2–1)
BUN SERPL-MCNC: 17 MG/DL (ref 7–18)
BUN/CREAT SERPL: 13 (ref 12–20)
CALCIUM SERPL-MCNC: 9.3 MG/DL (ref 8.5–10.1)
CHLORIDE SERPL-SCNC: 104 MMOL/L (ref 100–108)
CHOLEST SERPL-MCNC: 184 MG/DL
CO2 SERPL-SCNC: 27 MMOL/L (ref 21–32)
CREAT SERPL-MCNC: 1.3 MG/DL (ref 0.6–1.3)
DIFFERENTIAL METHOD BLD: ABNORMAL
EOSINOPHIL # BLD: 0.1 K/UL (ref 0–0.4)
EOSINOPHIL NFR BLD: 1 % (ref 0–5)
ERYTHROCYTE [DISTWIDTH] IN BLOOD BY AUTOMATED COUNT: 13.8 % (ref 11.6–14.5)
GLOBULIN SER CALC-MCNC: 3.2 G/DL (ref 2–4)
GLUCOSE SERPL-MCNC: 112 MG/DL (ref 74–99)
HCT VFR BLD AUTO: 42.8 % (ref 36–48)
HDLC SERPL-MCNC: 41 MG/DL (ref 40–60)
HDLC SERPL: 4.5 {RATIO} (ref 0–5)
HGB BLD-MCNC: 14.5 G/DL (ref 13–16)
LDLC SERPL CALC-MCNC: 118.6 MG/DL (ref 0–100)
LIPID PROFILE,FLP: ABNORMAL
LYMPHOCYTES # BLD: 1.7 K/UL (ref 0.9–3.6)
LYMPHOCYTES NFR BLD: 21 % (ref 21–52)
MCH RBC QN AUTO: 29.4 PG (ref 24–34)
MCHC RBC AUTO-ENTMCNC: 33.9 G/DL (ref 31–37)
MCV RBC AUTO: 86.8 FL (ref 74–97)
MONOCYTES # BLD: 0.6 K/UL (ref 0.05–1.2)
MONOCYTES NFR BLD: 8 % (ref 3–10)
NEUTS SEG # BLD: 5.5 K/UL (ref 1.8–8)
NEUTS SEG NFR BLD: 70 % (ref 40–73)
PLATELET # BLD AUTO: 219 K/UL (ref 135–420)
PMV BLD AUTO: 9.1 FL (ref 9.2–11.8)
POTASSIUM SERPL-SCNC: 4.7 MMOL/L (ref 3.5–5.5)
PROT SERPL-MCNC: 7.1 G/DL (ref 6.4–8.2)
RBC # BLD AUTO: 4.93 M/UL (ref 4.7–5.5)
SODIUM SERPL-SCNC: 141 MMOL/L (ref 136–145)
T4 SERPL-MCNC: 7.9 UG/DL (ref 4.5–10.9)
TRIGL SERPL-MCNC: 122 MG/DL (ref ?–150)
VLDLC SERPL CALC-MCNC: 24.4 MG/DL
WBC # BLD AUTO: 7.9 K/UL (ref 4.6–13.2)

## 2017-12-06 PROCEDURE — 80053 COMPREHEN METABOLIC PANEL: CPT

## 2017-12-06 PROCEDURE — 84436 ASSAY OF TOTAL THYROXINE: CPT

## 2017-12-06 PROCEDURE — 85025 COMPLETE CBC W/AUTO DIFF WBC: CPT

## 2017-12-06 PROCEDURE — 36415 COLL VENOUS BLD VENIPUNCTURE: CPT

## 2017-12-06 PROCEDURE — 80061 LIPID PANEL: CPT

## 2017-12-06 NOTE — PROGRESS NOTES
1. Have you been to the ER, urgent care clinic since your last visit? Hospitalized since your last visit? No     2. Have you seen or consulted any other health care providers outside of the [address] [place] [address] since your last visit? Include any pap smears or colon screening.  No

## 2017-12-06 NOTE — MR AVS SNAPSHOT
Visit Information Date & Time Provider Department Dept. Phone Encounter #  
 12/6/2017 11:20 AM Naa Austin MD [facility] [id number] Your Appointments 12/26/2017  9:45 AM  
PROCEDURE with Mandeep Dale MD  
[facility] ([facility]) Appt Note: Cystoscopy per Lrv  
 [address], [address] [facility]  
  
   
 [address], [place] [address] [address]  
  
    
 2/8/2018  1:00 PM  
PROCEDURE with Pacer Hv [facility] [address] ([facility]) Appt Note: 6 month check [place] [address] [address] [address] [phone] [address] [facility] [address] Upcoming Health Maintenance Date Due DTaP/Tdap/Td series (1 - Tdap) 10/31/1964 FOBT Q 1 YEAR AGE 50-75 10/31/1993 ZOSTER VACCINE AGE 60> 8/31/2003 GLAUCOMA SCREENING Q2Y 10/31/2008 MEDICARE YEARLY EXAM 10/31/2008 Pneumococcal 65+ Low/Medium Risk (2 of 2 - PPSV23) 1/1/2011 Influenza Age 5 to Adult 8/1/2017 Allergies as of 12/6/2017  Review Complete On: 8/3/2017 By: Todd Hummel No Known Allergies Current Immunizations  Reviewed on 12/17/2013 Name Date Pneumococcal Vaccine (Unspecified Type) 1/1/2006 Not reviewed this visit You Were Diagnosed With   
  
 Codes Comments Complete heart block (HCC)    -  Primary ICD-10-CM: Y81.5 ICD-9-CM: 426.0 Atherosclerosis of native coronary artery of native heart without angina pectoris     ICD-10-CM: I25.10 ICD-9-CM: 414.01 Hyperlipidemia, unspecified hyperlipidemia type     ICD-10-CM: E78.5 ICD-9-CM: 272.4 PAD (peripheral artery disease) (HCC)     ICD-10-CM: I73.9 ICD-9-CM: 443. 9 Abnormal finding of blood chemistry     ICD-10-CM: R79.9 ICD-9-CM: 790.6 Essential (primary) hypertension     ICD-10-CM: I10 
ICD-9-CM: 401.9 Vitals BP Pulse Height(growth percentile) Weight(growth percentile) SpO2 BMI  
 130/86 61 5' 9\" (1.753 m) 241 lb (109.3 kg) 98% 35.59 kg/m2 Smoking Status Never Smoker Vitals History BMI and BSA Data Body Mass Index Body Surface Area 35.59 kg/m 2 2.31 m 2 Preferred Pharmacy Pharmacy Name Phone [facility] [place], [address]. [phone] Your Updated Medication List  
  
   
This list is accurate as of: 12/6/17 12:38 PM.  Always use your most recent med list.  
  
  
  
  
 ASCORBYL PALMITATE (BULK)  
by Does Not Apply route. aspirin 81 mg tablet Take 162 mg by mouth daily. Cholecalciferol (Vitamin D3) 3,000 unit Tab Take 400 Units by mouth daily. clopidogrel 75 mg Tab Commonly known as:  PLAVIX Take 1 Tab by mouth daily. COQ10  100-100 mg-unit Cap Generic drug:  coenzyme q10-vitamin e Take  by mouth two (2) times a day. CURCUMIN Take 1 Cap by mouth two (2) times a day. * desmopressin 0.2 mg tablet Commonly known as:  DDAVP Take 1 Tab by mouth nightly. Indications: PARTIAL CENTRAL DIABETES INSIPIDUS  
  
 * desmopressin 0.2 mg tablet Commonly known as:  DDAVP TAKE ONE TABLET BY MOUTH AS NEEDED DURING TRIPS  
  
 DHEA PO Take 50 mg by mouth daily. melatonin 3 mg tablet Take 3 mg by mouth nightly. Omega-3-DHA-EPA-Fish Oil 1,000 mg (120 mg-180 mg) Cap Take  by mouth daily. SELENIUM PO Take 1 Cap by mouth every Monday, Wednesday, Friday. * testosterone 30 mg/actuation (1.5 mL) Slpm  
Commonly known as:  AXIRON  
2 Actuation(s) by TransDERmal route daily. * testosterone 20.25 mg/1.25 gram (1.62 %) gel Commonly known as:  ANDROGEL Apply 1 Spray to affected area daily. Max Daily Amount: 20.25 mg. 2 pumps daily VITAMIN C 500 mg tablet Generic drug:  ascorbic acid (vitamin C) Take 500 mg by mouth daily. vitamin e 400 unit Tab Take  by mouth daily. * Notice: This list has 4 medication(s) that are the same as other medications prescribed for you. Read the directions carefully, and ask your doctor or other care provider to review them with you. To-Do List   
 12/06/2017 Lab:  CBC WITH AUTOMATED DIFF   
  
 12/06/2017 Lab:  LIPID PANEL   
  
 12/06/2017 Lab:  METABOLIC PANEL, COMPREHENSIVE   
  
 12/06/2017 Lab:  T4 (THYROXINE) Introducing [address]! Dear Netta Bauer: Thank you for requesting a [employer] account. Our records indicate that you already have an active [employer] account. You can access your account anytime at [email or web address] Did you know that you can access your hospital and ER discharge instructions at any time in [employer]? You can also review all of your test results from your hospital stay or ER visit. Additional Information If you have questions, please visit the Frequently Asked Questions section of the [employer] website at [email or web address] Remember, [employer] is NOT to be used for urgent needs. For medical emergencies, dial 911. Now available from your iPhone and Android! Please provide this summary of care documentation to your next provider. Your primary care clinician is listed as Jonnathan MCWILLIAMS. If you have any questions after today's visit, please call [phone].

## 2017-12-14 NOTE — PROGRESS NOTES
PATIENT NAME: Juan Ramesh         74 y.o.      1945              DATE:12/6/2017    REASON FOR VISIT: Coronary artery disease    HISTORY OF PRESENT ILLNESS:Denies chest pain and other symptoms suggestive of angina. Denies MUSA, PND, orthopnea. Denies palpitation, syncope, presyncope. Denies edema in the lower extremities. 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 act*  No date: Arthritis  7/13/2013: Ataxia  No date: Ataxia, late effect of cerebrovascular disease  No date: CAD (coronary artery disease)  7/31/2013: Carotid artery disease ([address]  7/13/2013: Carotid bruit  No date: Cognitive deficits, late effect of cerebrovasc*  6/14/2013: Complete heart block ([address]  6/14/2013: Complete heart block ([address]  6/14/2013: Coronary atherosclerosis of native coronary ar*  7/18/2017: Diabetes insipidus ([address]  2014: Fractured rib  2/20/2014: Hypogonadism male  7/13/2013: Normal pressure hydrocephalus  No date: Other testicular hypofunction  No date: Pacemaker  7/29/2013: Pacemaker malfunction  7/13/2013: Peripheral neuropathy  7/13/2013: S/P ventriculoperitoneal shunt  12/16/2013:  Third degree AV block ([address]  7/31/2013: Transient ischemic attack  7/31/2013: Transient ischemic attack  No date: Unspecified disorder of muscle, ligament, and *  No date: Unspecified hereditary and idiopathic peripher*  No date: Unspecified intestinal malabsorption    PAST SURGICAL HISTORY:   Past Surgical History:  10/2002: BRAIN SHUNT TUBE/RESERV INJECTN      Comment: [facility]  No date: HX APPENDECTOMY      Comment: [age] years old  2003: HX CORONARY ARTERY BYPASS GRAFT      Comment: Dr. Claudean Haver  2008: HX HERNIA REPAIR      Comment: abdominal a couple   No date: HX PACEMAKER  12/16/2013: [place] REMOVAL PERMANENT PACEMAKER PULSE GENERATOR*      Comment: 12/16/2013: REMOVAL PACER LEADS DUAL      Comment:        SOCIAL HISTORY:  Social History    Marital status:              Spouse name:                       Years of education:                 Number of children:               Social History Main Topics    Smoking status: Never Smoker                                                                Smokeless status: Never Used                        Alcohol use: Yes           7.0 oz/week    Drug use: No              Sexual activity: Not Currently        Social History Narrative    ** Merged History Encounter **             ALLERGIES:   No Known Allergies     CURRENT MEDICATIONS:   Current Outpatient Prescriptions:  desmopressin (DDAVP) 0.2 mg tablet, TAKE ONE TABLET BY MOUTH AS NEEDED DURING TRIPS  testosterone (ANDROGEL) 20.25 mg/1.25 gram (1.62 %) gel, Apply 1 Spray to affected area daily. Max Daily Amount: 20.25 mg. 2 pumps daily  desmopressin (DDAVP) 0.2 mg tablet, Take 1 Tab by mouth nightly. Indications: PARTIAL CENTRAL DIABETES INSIPIDUS  clopidogrel (PLAVIX) 75 mg tab, Take 1 Tab by mouth daily. Cholecalciferol, Vitamin D3, 3,000 unit tab, Take 400 Units by mouth daily. testosterone (AXIRON) 30 mg/1.5 mL /actuation slpm, 2 Actuation(s) by TransDERmal route daily. vitamin e 400 unit Tab, Take  by mouth daily. aspirin 81 mg tablet, Take 162 mg by mouth daily. Omega-3-DHA-EPA-Fish Oil 1,000 (120-180) mg Cap, Take  by mouth daily. melatonin 3 mg tablet, Take 3 mg by mouth nightly. ascorbic acid (VITAMIN C) 500 mg tablet, Take 500 mg by mouth daily. coenzyme q10-vitamin e (COQ10 ) 100-100 mg-unit Cap, Take  by mouth two (2) times a day. SELENIUM PO, Take 1 Cap by mouth every Monday, Wednesday, Friday. ASCORBYL PALMITATE, BULK,, by Does Not Apply route. PRASTERONE, DHEA, (DHEA PO), Take 50 mg by mouth daily. TURMERIC (CURCUMIN), Take 1 Cap by mouth two (2) times a day. No current facility-administered medications for this visit. REVIEW of SYSTEMS:History obtained from the patient  General ROS: negative for - weight gain or weight loss  Cardiovascular ROS: See history of present illness     PHYSICAL EXAMINATION:   /86  Pulse 61  Ht 5' 9\" (1.753 m)  Wt 109.3 kg (241 lb)  SpO2 98%  BMI 35.59 kg/m2  BP Readings from Last 3 Encounters:  12/06/17 : 130/86  08/03/17 : (!) 142/92  07/18/17 : 130/72    Pulse Readings from Last 3 Encounters:  12/06/17 : 61  08/03/17 : 73  02/23/17 : 79    Wt Readings from Last 3 Encounters:  12/06/17 : 109.3 kg (241 lb)  08/03/17 : 110.7 kg (244 lb)  07/18/17 : 105.7 kg (233 lb)    HEENT: Sclera clear. Mucous membranes pink and moist.  Neck: No jugular venous distention. Carotid upstrokes 2+ without bruits. Chest: Clear to  auscultation. Heart: PMI not palpable. Regular rhythm. No murmur or gallop. Abdomen: Nontender without masses or organomegaly. Extremities: 1+ edema. Skin: Warm and dry. No stasis changes. Neuro: Alert, oriented, speech WNL, no facial asymmetry. Mraina Nat IMPRESSION:   Coronary artery disease, asymptomatic status post coronary artery bypass surgery  Hyperlipidemia    PLAN:  Lipid profile  Comprehensive metabolic profile  Return to office in 4 months    The diagnoses and plan were discussed with patient. All questions answered. Plan of care agreed to by all concerned. Oly Monk MD       ,

## 2017-12-18 RX ORDER — AZITHROMYCIN 250 MG/1
TABLET, FILM COATED ORAL
Qty: 6 TAB | Refills: 0 | OUTPATIENT
Start: 2017-12-18 | End: 2017-12-26 | Stop reason: ALTCHOICE

## 2017-12-26 PROBLEM — R31.0 GROSS HEMATURIA: Status: ACTIVE | Noted: 2017-12-26

## 2017-12-26 PROBLEM — N41.9 PROSTATITIS: Status: ACTIVE | Noted: 2017-12-26

## 2018-01-29 NOTE — PROGRESS NOTES
PT aware of NPO status. PT aware of need to hold anticoagulants per protocol. Holding plavix and asa. PT aware of potential for sedation administration and need for  at discharge. PT aware of arrival time pre procedure, 0800. Pt states no questions at this time.

## 2018-02-02 ENCOUNTER — HOSPITAL ENCOUNTER (OUTPATIENT)
Dept: GENERAL RADIOLOGY | Age: 73
Discharge: HOME | End: 2018-02-02
Attending: ORTHOPAEDIC SURGERY | Admitting: RADIOLOGY
Payer: MEDICARE

## 2018-02-02 ENCOUNTER — HOSPITAL ENCOUNTER (OUTPATIENT)
Dept: CT IMAGING | Age: 73
Discharge: HOME | End: 2018-02-02
Attending: ORTHOPAEDIC SURGERY
Payer: MEDICARE

## 2018-02-02 VITALS
HEIGHT: 71 IN | TEMPERATURE: 98.4 F | WEIGHT: 236 LBS | DIASTOLIC BLOOD PRESSURE: 80 MMHG | OXYGEN SATURATION: 96 % | HEART RATE: 66 BPM | RESPIRATION RATE: 16 BRPM | BODY MASS INDEX: 33.04 KG/M2 | SYSTOLIC BLOOD PRESSURE: 122 MMHG

## 2018-02-02 DIAGNOSIS — M54.2 CERVICALGIA: ICD-10-CM

## 2018-02-02 PROCEDURE — 74011000250 HC RX REV CODE- 250: Performed by: ORTHOPAEDIC SURGERY

## 2018-02-02 PROCEDURE — 62302 MYELOGRAPHY LUMBAR INJECTION: CPT

## 2018-02-02 PROCEDURE — 74011250637 HC RX REV CODE- 250/637: Performed by: RADIOLOGY

## 2018-02-02 PROCEDURE — 72126 CT NECK SPINE W/DYE: CPT

## 2018-02-02 PROCEDURE — 74011636320 HC RX REV CODE- 636/320: Performed by: ORTHOPAEDIC SURGERY

## 2018-02-02 RX ORDER — LIDOCAINE HYDROCHLORIDE 10 MG/ML
1-10 INJECTION, SOLUTION EPIDURAL; INFILTRATION; INTRACAUDAL; PERINEURAL
Status: COMPLETED | OUTPATIENT
Start: 2018-02-02 | End: 2018-02-02

## 2018-02-02 RX ORDER — ACETAMINOPHEN 325 MG/1
650 TABLET ORAL
Status: DISCONTINUED | OUTPATIENT
Start: 2018-02-02 | End: 2018-02-02 | Stop reason: HOSPADM

## 2018-02-02 RX ORDER — DIAZEPAM 5 MG/1
10 TABLET ORAL ONCE
Status: COMPLETED | OUTPATIENT
Start: 2018-02-02 | End: 2018-02-02

## 2018-02-02 RX ADMIN — DIAZEPAM 10 MG: 5 TABLET ORAL at 09:14

## 2018-02-02 RX ADMIN — IOPAMIDOL 12 ML: 612 INJECTION, SOLUTION INTRATHECAL at 10:20

## 2018-02-02 RX ADMIN — LIDOCAINE HYDROCHLORIDE 4 ML: 10 INJECTION, SOLUTION EPIDURAL; INFILTRATION; INTRACAUDAL; PERINEURAL at 10:20

## 2018-02-02 NOTE — DISCHARGE INSTRUCTIONS
DISCHARGE SUMMARY from Nurse    PATIENT INSTRUCTIONS:    After general anesthesia or intravenous sedation, for 24 hours or while taking prescription Narcotics:  · Limit your activities  · Do not drive and operate hazardous machinery  · Do not make important personal or business decisions  · Do  not drink alcoholic beverages  · If you have not urinated within 8 hours after discharge, please contact your surgeon on call. Report the following to your surgeon:  · Excessive pain, swelling, redness or odor of or around the surgical area  · Temperature over 100.5  · Nausea and vomiting lasting longer than 4 hours or if unable to take medications  · Any signs of decreased circulation or nerve impairment to extremity: change in color, persistent  numbness, tingling, coldness or increase pain  · Any questions    What to do at Home:  Recommended activity: Activity as tolerated and no driving for today,     *  Please give a list of your current medications to your Primary Care Provider. *  Please update this list whenever your medications are discontinued, doses are      changed, or new medications (including over-the-counter products) are added. *  Please carry medication information at all times in case of emergency situations. These are general instructions for a healthy lifestyle:    No smoking/ No tobacco products/ Avoid exposure to second hand smoke  Surgeon General's Warning:  Quitting smoking now greatly reduces serious risk to your health. Obesity, smoking, and sedentary lifestyle greatly increases your risk for illness    A healthy diet, regular physical exercise & weight monitoring are important for maintaining a healthy lifestyle    You may be retaining fluid if you have a history of heart failure or if you experience any of the following symptoms:  Weight gain of 3 pounds or more overnight or 5 pounds in a week, increased swelling in our hands or feet or shortness of breath while lying flat in bed. Please call your doctor as soon as you notice any of these symptoms; do not wait until your next office visit. Recognize signs and symptoms of STROKE:    F-face looks uneven    A-arms unable to move or move unevenly    S-speech slurred or non-existent    T-time-call 911 as soon as signs and symptoms begin-DO NOT go       Back to bed or wait to see if you get better-TIME IS BRAIN. Warning Signs of HEART ATTACK     Call 911 if you have these symptoms:   Chest discomfort. Most heart attacks involve discomfort in the center of the chest that lasts more than a few minutes, or that goes away and comes back. It can feel like uncomfortable pressure, squeezing, fullness, or pain.  Discomfort in other areas of the upper body. Symptoms can include pain or discomfort in one or both arms, the back, neck, jaw, or stomach.  Shortness of breath with or without chest discomfort.  Other signs may include breaking out in a cold sweat, nausea, or lightheadedness. Don't wait more than five minutes to call 911 - MINUTES MATTER! Fast action can save your life. Calling 911 is almost always the fastest way to get lifesaving treatment. Emergency Medical Services staff can begin treatment when they arrive -- up to an hour sooner than if someone gets to the hospital by car. The discharge information has been reviewed with the patient and spouse. The patient and spouse verbalized understanding. Discharge medications reviewed with the patient and spouse and appropriate educational materials and side effects teaching were provided. ___________________________________________________________________________________________________________________________________     Myelogram: About This Test  What is it? A myelogram uses X-rays and a special dye to make pictures of bones and nerves of the spine (spinal canal).  The spinal canal holds the spinal cord, the spinal nerve roots, and the fluid-filled space between the bones in your spine. Why is this test done? A myelogram is done to check for:  · The cause of arm or leg numbness, weakness, or pain. · Narrowing of the spinal canal (spinal stenosis). · A tumor or infection causing problems with the spinal cord or nerve roots. · A spinal disc that has ruptured (herniated disc). · Inflammation of the membrane that covers the brain and spinal cord. · Problems with the blood vessels to the spine. How can you prepare for the test?  Your doctor will tell you if you need to change how much you eat and drink before the myelogram. You may be asked to increase the amount of water you drink before the test. Follow the instructions your doctor gives you about eating and drinking. Before a myelogram, tell your doctor if:  · You are allergic to any medicines, contrast material, or iodine dye. · You have had bleeding problems, or you take a blood thinner, such as aspirin, clopidogrel (Plavix), or warfarin (Coumadin), or you take over-the-counter medicines, such as ibuprofen (Advil, Motrin) or naproxen (Aleve). Your doctor will tell you when you should stop taking these medicines several days before your procedure. Make sure that you understand exactly what he or she wants you to do. · You have had kidney problems. · You have diabetes, especially if you take metformin (Glucophage, for example). · You are or might be pregnant. Ask someone to take you home and stay with you after the test.  What happens during the test?  The dye is put into your spinal canal with a thin needle. This is called a lumbar puncture. The dye moves through the space so the nerve roots and spinal cord can be seen more clearly. After the dye is put in, you will lie still while the X-ray pictures are taken. How does it feel? You will feel a quick sting from a small needle that has medicine to numb the skin on your back.  You will also feel some pressure as the long, thin spinal needle is put into your spinal canal. You may feel a quick sharp pain down your buttock or leg when the needle is moved in your spine. The dye may make you feel warm and flushed and have a metallic taste in your mouth. What else should you know about the test?  In rare cases, the hole made by the needle in the sac around the spine does not close normally. This can allow spinal fluid to leak out. This leak may need to be repaired through a procedure called an epidural blood patch. To do the patch, your doctor injects some of your own blood to cover the hole. How long does the test take? · A myelogram usually takes 30 minutes to 1 hour. What happens after the test?  · You will probably be able to go home 30 minutes to 2 hours after the test.  · You may need to lie in bed with your head raised for 4 to 24 hours after the test. To prevent seizures, which are a rare side effect, do not bend over or lie down with your head lower than your body. Keeping your head higher than your body after a myelogram also may help prevent or reduce other side effects, such as headache, nausea, or vomiting. · Avoid strenuous activity, such as running or heavy lifting, for at least 1 day after your myelogram.  · Drink plenty of water after the myelogram. Your doctor will give you instructions on taking your regular medicines. When should you call for help? Call 911 anytime you think you may need emergency care. For example, call if:  · You have a seizure. Call your doctor now or seek immediate medical care if:  · You have any increase in pain, weakness, or numbness in your legs. · You have a severe headache or stiff neck, or your eyes become very sensitive to light. · You have a headache that lasts longer than 24 hours. · You have problems urinating or having a bowel movement. · You have a fever. Follow-up care is a key part of your treatment and safety. Be sure to make and go to all appointments, and call your doctor if you are having problems.  It's also a good idea to keep a list of the medicines you take. Ask your doctor when you can expect to have your test results. Where can you learn more? Go to http://reyes-igor.info/. Enter L625 in the search box to learn more about \"Myelogram: About This Test.\"  Current as of: October 14, 2016  Content Version: 11.4  © 3721-3720 [employer]. Care instructions adapted under license by [employer] (which disclaims liability or warranty for this information). If you have questions about a medical condition or this instruction, always ask your healthcare professional. [place] [address] any warranty or liability for your use of this information.

## 2018-02-02 NOTE — IP AVS SNAPSHOT
[address] [address] 
[phone] Patient: Lashay Maldonado MRN: [id number] YLY:[id number] About your hospitalization You were admitted on:  February 2, 2018 You last received care in the:  [address] You were discharged on:  February 2, 2018 Why you were hospitalized Your primary diagnosis was:  Not on File Follow-up Information Follow up With Details Comments Contact Info Tremaine Burns MD  as scheduled [address] [facility] and [address] [place] [address] 
[phone] Myah Carmen MD   [facility] [address] [facility] 
[phone] Your Scheduled Appointments Thursday February 08, 2018  1:00 PM EST PROCEDURE with [facility] ([facility]) [facility] [address] [address] [address] [phone] Discharge Orders None A check ed indicates which time of day the medication should be taken. My Medications ASK your doctor about these medications Instructions Each Dose to Equal  
 Morning Noon Evening Bedtime ASCORBYL PALMITATE (BULK) Your last dose was: Your next dose is:    
   
   
 by Does Not Apply route. aspirin 81 mg tablet Your last dose was: Your next dose is: Take 162 mg by mouth daily. 162 mg Cholecalciferol (Vitamin D3) 3,000 unit Tab Your last dose was: Your next dose is: Take 400 Units by mouth daily. 400 Units  
    
   
   
   
  
 clopidogrel 75 mg Tab Commonly known as:  PLAVIX Your last dose was: Your next dose is: Take 1 Tab by mouth daily. 75 mg  
    
   
   
   
  
 COQ10  100-100 mg-unit Cap Generic drug:  coenzyme q10-vitamin e Your last dose was: Your next dose is: Take  by mouth two (2) times a day. CURCUMIN Your last dose was: Your next dose is: Take 1 Cap by mouth two (2) times a day. 1 Cap * desmopressin 0.2 mg tablet Commonly known as:  DDAVP Your last dose was: Your next dose is: Take 1 Tab by mouth nightly. Indications: PARTIAL CENTRAL DIABETES INSIPIDUS  
 0.2 mg  
    
   
   
   
  
 * desmopressin 0.2 mg tablet Commonly known as:  DDAVP Your last dose was: Your next dose is: TAKE ONE TABLET BY MOUTH AS NEEDED DURING TRIPS  
     
   
   
   
  
 DHEA PO Your last dose was: Your next dose is: Take 50 mg by mouth daily. 50 mg  
    
   
   
   
  
 melatonin 3 mg tablet Your last dose was: Your next dose is: Take 3 mg by mouth nightly. 3 mg Omega-3-DHA-EPA-Fish Oil 1,000 mg (120 mg-180 mg) Cap Your last dose was: Your next dose is: Take  by mouth daily. SELENIUM PO Your last dose was: Your next dose is: Take 1 Cap by mouth every Monday, Wednesday, Friday. 1 Cap * testosterone 30 mg/actuation (1.5 mL) Slpm  
Commonly known as:  Raynell Mack Your last dose was: Your next dose is:    
   
   
 2 Actuation(s) by TransDERmal route daily. 2 Actuation(s) * testosterone 20.25 mg/1.25 gram (1.62 %) gel Commonly known as:  ANDROGEL Your last dose was: Your next dose is:    
   
   
 Apply 1 Spray to affected area daily. Max Daily Amount: 20.25 mg. 2 pumps daily 20.25 mg  
    
   
   
   
  
 VITAMIN C 500 mg tablet Generic drug:  ascorbic acid (vitamin C) Your last dose was: Your next dose is: Take 500 mg by mouth daily.   
 500 mg  
    
   
   
 vitamin e 400 unit Tab Your last dose was: Your next dose is: Take  by mouth daily. * Notice: This list has 4 medication(s) that are the same as other medications prescribed for you. Read the directions carefully, and ask your doctor or other care provider to review them with you. Discharge Instructions DISCHARGE SUMMARY from Nurse PATIENT INSTRUCTIONS: 
 
 
F-face looks uneven A-arms unable to move or move unevenly S-speech slurred or non-existent T-time-call 911 as soon as signs and symptoms begin-DO NOT go Back to bed or wait to see if you get better-TIME IS BRAIN. Warning Signs of HEART ATTACK Call 911 if you have these symptoms: 
? Chest discomfort. Most heart attacks involve discomfort in the center of the chest that lasts more than a few minutes, or that goes away and comes back. It can feel like uncomfortable pressure, squeezing, fullness, or pain. ? Discomfort in other areas of the upper body. Symptoms can include pain or discomfort in one or both arms, the back, neck, jaw, or stomach. ? Shortness of breath with or without chest discomfort. ? Other signs may include breaking out in a cold sweat, nausea, or lightheadedness. Don't wait more than five minutes to call [address]! Fast action can save your life. Calling 911 is almost always the fastest way to get lifesaving treatment. Emergency Medical Services staff can begin treatment when they arrive  up to an hour sooner than if someone gets to the hospital by car. The discharge information has been reviewed with the patient and spouse. The patient and spouse verbalized understanding. Discharge medications reviewed with the patient and spouse and appropriate educational materials and side effects teaching were provided. ___________________________________________________________________________________________________________________________________ Myelogram: About This Test 
What is it? A myelogram uses X-rays and a special dye to make pictures of bones and nerves of the spine (spinal canal). The spinal canal holds the spinal cord, the spinal nerve roots, and the fluid-filled space between the bones in your spine. Why is this test done? A myelogram is done to check for: · The cause of arm or leg numbness, weakness, or pain. · Narrowing of the spinal canal (spinal stenosis). · A tumor or infection causing problems with the spinal cord or nerve roots. · A spinal disc that has ruptured (herniated disc). · Inflammation of the membrane that covers the brain and spinal cord. · Problems with the blood vessels to the spine. How can you prepare for the test? 
Your doctor will tell you if you need to change how much you eat and drink before the myelogram. You may be asked to increase the amount of water you drink before the test. Follow the instructions your doctor gives you about eating and drinking. Before a myelogram, tell your doctor if: 
· You are allergic to any medicines, contrast material, or iodine dye. · You have had bleeding problems, or you take a blood thinner, such as aspirin, clopidogrel (Plavix), or warfarin (Coumadin), or you take over-the-counter medicines, such as ibuprofen (Advil, Motrin) or naproxen (Aleve). Your doctor will tell you when you should stop taking these medicines several days before your procedure. Make sure that you understand exactly what he or she wants you to do. · You have had kidney problems. · You have diabetes, especially if you take metformin (Glucophage, for example). · You are or might be pregnant. Ask someone to take you home and stay with you after the test. 
What happens during the test? 
The dye is put into your spinal canal with a thin needle.  This is called a lumbar puncture. The dye moves through the space so the nerve roots and spinal cord can be seen more clearly. After the dye is put in, you will lie still while the X-ray pictures are taken. How does it feel? You will feel a quick sting from a small needle that has medicine to numb the skin on your back. You will also feel some pressure as the long, thin spinal needle is put into your spinal canal. You may feel a quick sharp pain down your buttock or leg when the needle is moved in your spine. The dye may make you feel warm and flushed and have a metallic taste in your mouth. What else should you know about the test? 
In rare cases, the hole made by the needle in the sac around the spine does not close normally. This can allow spinal fluid to leak out. This leak may need to be repaired through a procedure called an epidural blood patch. To do the patch, your doctor injects some of your own blood to cover the hole. How long does the test take? · A myelogram usually takes 30 minutes to 1 hour. What happens after the test? 
· You will probably be able to go home 30 minutes to 2 hours after the test. 
· You may need to lie in bed with your head raised for 4 to 24 hours after the test. To prevent seizures, which are a rare side effect, do not bend over or lie down with your head lower than your body. Keeping your head higher than your body after a myelogram also may help prevent or reduce other side effects, such as headache, nausea, or vomiting. · Avoid strenuous activity, such as running or heavy lifting, for at least 1 day after your myelogram. 
· Drink plenty of water after the myelogram. Your doctor will give you instructions on taking your regular medicines. When should you call for help? Call 911 anytime you think you may need emergency care. For example, call if: 
· You have a seizure. Call your doctor now or seek immediate medical care if: · You have any increase in pain, weakness, or numbness in your legs. · You have a severe headache or stiff neck, or your eyes become very sensitive to light. · You have a headache that lasts longer than 24 hours. · You have problems urinating or having a bowel movement. · You have a fever. Follow-up care is a key part of your treatment and safety. Be sure to make and go to all appointments, and call your doctor if you are having problems. It's also a good idea to keep a list of the medicines you take. Ask your doctor when you can expect to have your test results. Where can you learn more? Go to http://reyes-igor.info/. Enter Q661 in the search box to learn more about \"Myelogram: About This Test.\" Current as of: October 14, 2016 Content Version: 11.4 © 3282-9657 [employer]. Care instructions adapted under license by [employer] (which disclaims liability or warranty for this information). If you have questions about a medical condition or this instruction, always ask your healthcare professional. [address] any warranty or liability for your use of this information. ACO Transitions of Care Introducing [employer] offers a voluntary care coordination program to provide high quality service and care to [facility] fee-for-service beneficiaries. May Brown was designed to help you enhance your health and well-being through the following services: ? Transitions of Care  support for individuals who are transitioning from one care setting to another (example: Hospital to home). ? Chronic and Complex Care Coordination  support for individuals and caregivers of those with serious or chronic illnesses or with more than one chronic (ongoing) condition and those who take a number of different medications. If you meet specific medical criteria, a [facility] [address] may call you directly to coordinate your care with your primary care physician and your other care providers. For questions about the [facility] programs, please, contact your physicians office. For general questions or additional information about Accountable Care Organizations: 
Please visit www.medicare.gov/acos. html or call 1-800-MEDICARE (3-180.689.2414) TTY users should call 5-849.221.7288. Introducing [facility]! Dear Viji Schofield: Thank you for requesting a [employer] account. Our records indicate that you already have an active [employer] account. You can access your account anytime at [email or web address] Did you know that you can access your hospital and ER discharge instructions at any time in [employer]? You can also review all of your test results from your hospital stay or ER visit. Additional Information If you have questions, please visit the Frequently Asked Questions section of the [employer] website at [email or web address] Remember, [employer] is NOT to be used for urgent needs. For medical emergencies, dial 911. Now available from your iPhone and Android! Unresulted Labs-Please follow up with your PCP about these lab tests Order Current Status XR MYELO CERVICAL In process Providers Seen During Your Hospitalization Provider Specialty Primary office phone Geovanny Tyosn, [address] [facility] [phone] Your Primary Care Physician (PCP) Primary Care Physician Office Phone Office Fax Monik Busch [phone] You are allergic to the following No active allergies Recent Documentation Height Weight BMI Smoking Status 1.803 m 107 kg 32.92 kg/m2 Never Smoker Emergency Contacts Name Discharge Info Relation Home Work Mobile [facility] DISCHARGE CAREGIVER [3] Daughter [21] [phone] [facility] DISCHARGE CAREGIVER [3] Life Partner [7] [phone] Patient Belongings The following personal items are in your possession at time of discharge: 
     Visual Aid: None Please provide this summary of care documentation to your next provider. Signatures-by signing, you are acknowledging that this After Visit Summary has been reviewed with you and you have received a copy. Patient Signature:  ____________________________________________________________ Date:  ____________________________________________________________  
  
[facility] Provider Signature:  ____________________________________________________________ Date:  ____________________________________________________________

## 2018-02-02 NOTE — PROGRESS NOTES
Pt discharged via wheelchair to car in care  Of daughter, discharge inst given to pt.  Information reviewed,  Pt denies any pain or distress at time of discharge,  Arm bands removed and shredded

## 2018-02-08 ENCOUNTER — CLINICAL SUPPORT (OUTPATIENT)
Dept: CARDIOLOGY CLINIC | Age: 73
End: 2018-02-08

## 2018-02-08 DIAGNOSIS — Z95.0 CARDIAC PACEMAKER IN SITU: ICD-10-CM

## 2018-02-08 DIAGNOSIS — I44.2 COMPLETE HEART BLOCK (HCC): Primary | ICD-10-CM

## 2018-02-09 ENCOUNTER — HOSPITAL ENCOUNTER (OUTPATIENT)
Dept: PREADMISSION TESTING | Age: 73
Discharge: HOME | End: 2018-02-09
Payer: MEDICARE

## 2018-02-09 DIAGNOSIS — M48.062 SPINAL STENOSIS, LUMBAR REGION, WITH NEUROGENIC CLAUDICATION: ICD-10-CM

## 2018-02-09 DIAGNOSIS — Z01.818 PREOP EXAMINATION: ICD-10-CM

## 2018-02-09 LAB
ALBUMIN SERPL-MCNC: 3.8 G/DL (ref 3.4–5)
ALBUMIN/GLOB SERPL: 1.1 {RATIO} (ref 0.8–1.7)
ALP SERPL-CCNC: 80 U/L (ref 45–117)
ALT SERPL-CCNC: 25 U/L (ref 16–61)
ANION GAP SERPL CALC-SCNC: 13 MMOL/L (ref 3–18)
AST SERPL-CCNC: 18 U/L (ref 15–37)
ATRIAL RATE: 76 BPM
BILIRUB SERPL-MCNC: 0.7 MG/DL (ref 0.2–1)
BUN SERPL-MCNC: 20 MG/DL (ref 7–18)
BUN/CREAT SERPL: 17 (ref 12–20)
CALCIUM SERPL-MCNC: 8.9 MG/DL (ref 8.5–10.1)
CALCULATED P AXIS, ECG09: 113 DEGREES
CALCULATED R AXIS, ECG10: -115 DEGREES
CALCULATED T AXIS, ECG11: 38 DEGREES
CHLORIDE SERPL-SCNC: 104 MMOL/L (ref 100–108)
CO2 SERPL-SCNC: 24 MMOL/L (ref 21–32)
CREAT SERPL-MCNC: 1.18 MG/DL (ref 0.6–1.3)
DIAGNOSIS, 93000: NORMAL
ERYTHROCYTE [DISTWIDTH] IN BLOOD BY AUTOMATED COUNT: 15.2 % (ref 11.6–14.5)
GLOBULIN SER CALC-MCNC: 3.4 G/DL (ref 2–4)
GLUCOSE SERPL-MCNC: 110 MG/DL (ref 74–99)
HCT VFR BLD AUTO: 44.9 % (ref 36–48)
HGB BLD-MCNC: 15.4 G/DL (ref 13–16)
MCH RBC QN AUTO: 29.8 PG (ref 24–34)
MCHC RBC AUTO-ENTMCNC: 34.3 G/DL (ref 31–37)
MCV RBC AUTO: 86.8 FL (ref 74–97)
P-R INTERVAL, ECG05: 236 MS
PLATELET # BLD AUTO: 180 K/UL (ref 135–420)
PMV BLD AUTO: 9.7 FL (ref 9.2–11.8)
POTASSIUM SERPL-SCNC: 4.1 MMOL/L (ref 3.5–5.5)
PROT SERPL-MCNC: 7.2 G/DL (ref 6.4–8.2)
Q-T INTERVAL, ECG07: 450 MS
QRS DURATION, ECG06: 170 MS
QTC CALCULATION (BEZET), ECG08: 506 MS
RBC # BLD AUTO: 5.17 M/UL (ref 4.7–5.5)
SODIUM SERPL-SCNC: 141 MMOL/L (ref 136–145)
VENTRICULAR RATE, ECG03: 76 BPM
WBC # BLD AUTO: 7.3 K/UL (ref 4.6–13.2)

## 2018-02-09 PROCEDURE — 93005 ELECTROCARDIOGRAM TRACING: CPT

## 2018-02-09 PROCEDURE — 85027 COMPLETE CBC AUTOMATED: CPT | Performed by: ORTHOPAEDIC SURGERY

## 2018-02-09 PROCEDURE — 80053 COMPREHEN METABOLIC PANEL: CPT | Performed by: ORTHOPAEDIC SURGERY

## 2018-02-09 PROCEDURE — 36415 COLL VENOUS BLD VENIPUNCTURE: CPT | Performed by: ORTHOPAEDIC SURGERY

## 2018-02-09 PROCEDURE — 87641 MR-STAPH DNA AMP PROBE: CPT | Performed by: ORTHOPAEDIC SURGERY

## 2018-02-09 NOTE — PROGRESS NOTES
I have personally seen and evaluated the device findings. Interrogation reviewed and I agree with assessment.     Reyna Mejia

## 2018-02-10 LAB
BACTERIA SPEC CULT: NORMAL
FAX TO INFO,FAXT: NORMAL
FAX TO NUMBER,FAXN: NORMAL
SERVICE CMNT-IMP: NORMAL

## 2018-02-12 PROBLEM — M51.26 HNP (HERNIATED NUCLEUS PULPOSUS), LUMBAR: Status: ACTIVE | Noted: 2018-02-12

## 2018-02-12 PROBLEM — M48.061 SPINAL STENOSIS, LUMBAR: Status: ACTIVE | Noted: 2018-02-12

## 2018-02-13 NOTE — H&P
Patient Name:  Ileana Mcneill   YOB: 1945      Chief Complaint:  Ataxia and right lower extremity pain. 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 He has had right-sided buttock and thigh pain which he describes as \"L3 pain\". He has had multiple injuries including nine concussions, most recently in 2011. 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 He had CABG times five done by Dr. Gwyneth Phalen. He feels he is throwing emboli to the mid-brain and has TIAs and recovers in five minutes. The pain is constant. He has numbness and weakness with his left lower extremity giving out. There is no bowel or bladder dysfunction. He often trips and has had falls. The pain is worse with walking, exercise, standing, being overheated, and with external rotation of the hip. It is better with sitting and lying down. He quotes Sergei's textbook of neurosurgery and has a wide base of gait. He has taken Dilaudid and ibuprofen in the past. He had an MRI scan done at [facility] on 07/22/16. 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 Past Medical/Surgical History:    Disease/Disorder Type Date Side Surgery Date Side Comment       CABG   [facility] 12/20/2017 - [address]       Ventriculoatrial shunt placement          Appendectomy 1952         [facility] surgery 1971       Allergies:  No known allergies. Ingredient Reaction Medication Name Comment   NO KNOWN ALLERGIES        Current Medications:    Medication Directions   clopidogrel 75 mg tablet take 1 tablet by oral route  every day   desmopressin 0.2 mg tablet take 0.5 tablet by oral route  every day     Social History:      SMOKING  Status Tobacco Type Units Per Day Yrs Used   Never smoker        ALCOHOL   1 drink consumed daily.     Family History:    Disease Detail Family Member Age Cause of Death Comments   Cancer, prostate Father  Y      Review of Systems: Pertinent positives include blood in urine, indigestion, joint pain, joint stiffness, loss of balance, memory loss, muscle weakness and numbness/tingling. Pertinent negatives include blurred vision, chest pain, chills, cold, discharge of the eyes, dizziness, double vision, fever, headache, hearing loss, heart murmur, itching of the eyes, palpitations, redness of the eyes, rheumatic fever, ringing in ears, sore throat/hoarseness, weight change, abdominal pain, anxiety, bipolar disorder, bladder/kidney infection, bloody stool, burning sensation, changes in mood, chronic cough, depression, diarrhea, difficulty breathing, difficulty swallowing, fainting, frequent urinating, fracture/dislocation, gas/bloating, gout, hemorrhoids, incontinence, nausea/vomiting, pain on breathing, painful urination, psoriasis, rash/itching, Raynaud's phenomenon, rheumatoid disease, seizure disorder, shortness of breath, sprain/strain, swelling of feet, tendonitis, varicose veins and wheezing. Vitals:  Date BP Pulse Temp (F) Resp. (per min.) Height (Total in.) Weight (lbs.) BMI   12/21/2017     71.50 228.00 31.36     Physical Examination:    General:  The patient appears healthy. Cardiovascular:  Arterial pulses are normal.  Skin:  The skin is normal appearing with no contusions or wounds noted. Heart- RRR  Lungs-CTA darrion  Abdomen- +BS,soft,nontender  Musculoskeletal:  There is mild tenderness around the right greater trochanter. The thoracolumbar spine has normal kyphosis, a normal appearance, and no scoliosis. There is full range of motion of the cervical, thoracic, and lumbar spine and of the hips, knees, and ankles. Straight leg raising and crossed straight leg raising tests are negative. Neurological:  There is no weakness in the thoracic, lumbar, or sacral spine or in the lower extremities or hips. Deep tendon reflexes are present and normal bilaterally. Ankle and knee jerks are normal with no clonus.         Radiograph Examination:  AP, lateral, bilateral oblique, flexion and extension views of the cervical spine were obtained and interpreted in the office and reveal C3-4, C4-5, and C5-6 severe degenerative disc disease with a cranial abdominal shunt in place. AP, lateral, bilateral oblique, flexion and extension views of the lumbar spine were obtained and interpreted in the office 12/21/17 and reveal L2 to S1 degenerative disc  disease. An AP view of the pelvis was obtained and interpreted in the office  and reveals bilateral hip osteoarthritis. Review of an MRI scan done at [facility] on 07/22/16 reveals mild degenerative disc disease at L3-4 and L4-5, worse at L4-5. Review of his CT myelogram [facility] 2/2/18 reveals degenerative disc disease at C5-6 and C6-7 without significant neurologic impingement. 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 Plan:  We discussed treatments for the condition including surgical intervention, the risks, and benefits as well as the different surgical approaches and decision making. We also discussed nonsurgical care for this condition including medications, injections, physical therapy, rehabilitation, activity modification, and brace utilization. At this point, the epidural seems to be wearing off. He would like to proceed with operative intervention. We will plan for right-sided L3-4 and L4-5 microdiscectomy. The risks versus the benefits as well as the alternatives were fully explained to the patient. Risks include, but are not limited to, paralysis, death, heart attack, stroke, pulmonary embolism, deep vein thrombosis, infection, failure to relieve pain, increase in back or leg pain, reherniation of disc material, need for revision surgery, scarring, spinal fluid leak, bowel or bladder dysfunction, disease transmission, chronic graft site pain, instrumentation failure, pseudarthrosis, and the need for chronic ambulatory assist devices. The patient states full understanding of the risks and benefits and wishes to proceed.

## 2018-02-19 RX ORDER — CLOPIDOGREL BISULFATE 75 MG/1
75 TABLET ORAL DAILY
Qty: 90 TAB | Refills: 3 | Status: ON HOLD | OUTPATIENT
Start: 2018-02-19 | End: 2018-02-21

## 2018-02-21 ENCOUNTER — HOSPITAL ENCOUNTER (OUTPATIENT)
Dept: GENERAL RADIOLOGY | Age: 73
Discharge: HOME | End: 2018-02-21
Attending: ORTHOPAEDIC SURGERY

## 2018-02-21 ENCOUNTER — HOSPITAL ENCOUNTER (OUTPATIENT)
Age: 73
Setting detail: OUTPATIENT SURGERY
Discharge: HOME | End: 2018-02-21
Attending: ORTHOPAEDIC SURGERY | Admitting: ORTHOPAEDIC SURGERY
Payer: MEDICARE

## 2018-02-21 ENCOUNTER — ANESTHESIA EVENT (OUTPATIENT)
Dept: SURGERY | Age: 73
End: 2018-02-21
Payer: MEDICARE

## 2018-02-21 ENCOUNTER — ANESTHESIA (OUTPATIENT)
Dept: SURGERY | Age: 73
End: 2018-02-21
Payer: MEDICARE

## 2018-02-21 VITALS
WEIGHT: 239.44 LBS | RESPIRATION RATE: 15 BRPM | SYSTOLIC BLOOD PRESSURE: 138 MMHG | HEIGHT: 72 IN | BODY MASS INDEX: 32.43 KG/M2 | DIASTOLIC BLOOD PRESSURE: 80 MMHG | HEART RATE: 72 BPM | OXYGEN SATURATION: 98 % | TEMPERATURE: 97.7 F

## 2018-02-21 DIAGNOSIS — M48.062 SPINAL STENOSIS OF LUMBAR REGION WITH NEUROGENIC CLAUDICATION: Primary | ICD-10-CM

## 2018-02-21 DIAGNOSIS — M48.061 SPINAL STENOSIS OF LUMBAR REGION: ICD-10-CM

## 2018-02-21 PROBLEM — M51.26 HNP (HERNIATED NUCLEUS PULPOSUS), LUMBAR: Status: RESOLVED | Noted: 2018-02-12 | Resolved: 2018-02-21

## 2018-02-21 LAB — GLUCOSE BLD STRIP.AUTO-MCNC: 152 MG/DL (ref 70–110)

## 2018-02-21 PROCEDURE — 74011250636 HC RX REV CODE- 250/636: Performed by: ORTHOPAEDIC SURGERY

## 2018-02-21 PROCEDURE — G8978 MOBILITY CURRENT STATUS: HCPCS

## 2018-02-21 PROCEDURE — 77030014650 HC SEAL MTRX FLOSEL BAXT -C: Performed by: ORTHOPAEDIC SURGERY

## 2018-02-21 PROCEDURE — 77030003666 HC NDL SPINAL BD -A: Performed by: ORTHOPAEDIC SURGERY

## 2018-02-21 PROCEDURE — 77030027521 HC SEAL BPLR AQMNTYS EVS MEDT -F: Performed by: ORTHOPAEDIC SURGERY

## 2018-02-21 PROCEDURE — 77030031139 HC SUT VCRL2 J&J -A: Performed by: ORTHOPAEDIC SURGERY

## 2018-02-21 PROCEDURE — G8979 MOBILITY GOAL STATUS: HCPCS

## 2018-02-21 PROCEDURE — 77030013079 HC BLNKT BAIR HGGR 3M -A: Performed by: ANESTHESIOLOGY

## 2018-02-21 PROCEDURE — 77030006643: Performed by: ANESTHESIOLOGY

## 2018-02-21 PROCEDURE — 77030008683 HC TU ET CUF COVD -A: Performed by: ANESTHESIOLOGY

## 2018-02-21 PROCEDURE — 74011250636 HC RX REV CODE- 250/636

## 2018-02-21 PROCEDURE — 74011000250 HC RX REV CODE- 250

## 2018-02-21 PROCEDURE — 77030002986 HC SUT PROL J&J -A: Performed by: ORTHOPAEDIC SURGERY

## 2018-02-21 PROCEDURE — 74011000272 HC RX REV CODE- 272: Performed by: ORTHOPAEDIC SURGERY

## 2018-02-21 PROCEDURE — 74011000250 HC RX REV CODE- 250: Performed by: ORTHOPAEDIC SURGERY

## 2018-02-21 PROCEDURE — 77030037400 HC ADH TISS HI VISC EXOFIN CHMP -B: Performed by: ORTHOPAEDIC SURGERY

## 2018-02-21 PROCEDURE — 97161 PT EVAL LOW COMPLEX 20 MIN: CPT

## 2018-02-21 PROCEDURE — 77030008477 HC STYL SATN SLP COVD -A: Performed by: ANESTHESIOLOGY

## 2018-02-21 PROCEDURE — 74011250637 HC RX REV CODE- 250/637: Performed by: ANESTHESIOLOGY

## 2018-02-21 PROCEDURE — 76210000026 HC REC RM PH II 1 TO 1.5 HR: Performed by: ORTHOPAEDIC SURGERY

## 2018-02-21 PROCEDURE — 74011250636 HC RX REV CODE- 250/636: Performed by: ANESTHESIOLOGY

## 2018-02-21 PROCEDURE — 76210000006 HC OR PH I REC 0.5 TO 1 HR: Performed by: ORTHOPAEDIC SURGERY

## 2018-02-21 PROCEDURE — 76010000153 HC OR TIME 1.5 TO 2 HR: Performed by: ORTHOPAEDIC SURGERY

## 2018-02-21 PROCEDURE — 77030003029 HC SUT VCRL J&J -B: Performed by: ORTHOPAEDIC SURGERY

## 2018-02-21 PROCEDURE — 77030032490 HC SLV COMPR SCD KNE COVD -B: Performed by: ORTHOPAEDIC SURGERY

## 2018-02-21 PROCEDURE — 82962 GLUCOSE BLOOD TEST: CPT

## 2018-02-21 PROCEDURE — 77030020782 HC GWN BAIR PAWS FLX 3M -B: Performed by: ORTHOPAEDIC SURGERY

## 2018-02-21 PROCEDURE — 76060000034 HC ANESTHESIA 1.5 TO 2 HR: Performed by: ORTHOPAEDIC SURGERY

## 2018-02-21 PROCEDURE — 77030011640 HC PAD GRND REM COVD -A: Performed by: ORTHOPAEDIC SURGERY

## 2018-02-21 PROCEDURE — 77030018836 HC SOL IRR NACL ICUM -A: Performed by: ORTHOPAEDIC SURGERY

## 2018-02-21 PROCEDURE — 77030008462 HC STPLR SKN PROX J&J -A: Performed by: ORTHOPAEDIC SURGERY

## 2018-02-21 RX ORDER — ROCURONIUM BROMIDE 10 MG/ML
INJECTION, SOLUTION INTRAVENOUS AS NEEDED
Status: DISCONTINUED | OUTPATIENT
Start: 2018-02-21 | End: 2018-02-21 | Stop reason: HOSPADM

## 2018-02-21 RX ORDER — LIDOCAINE HYDROCHLORIDE 20 MG/ML
INJECTION, SOLUTION EPIDURAL; INFILTRATION; INTRACAUDAL; PERINEURAL AS NEEDED
Status: DISCONTINUED | OUTPATIENT
Start: 2018-02-21 | End: 2018-02-21 | Stop reason: HOSPADM

## 2018-02-21 RX ORDER — ONDANSETRON 2 MG/ML
INJECTION INTRAMUSCULAR; INTRAVENOUS AS NEEDED
Status: DISCONTINUED | OUTPATIENT
Start: 2018-02-21 | End: 2018-02-21 | Stop reason: HOSPADM

## 2018-02-21 RX ORDER — FENTANYL CITRATE 50 UG/ML
INJECTION, SOLUTION INTRAMUSCULAR; INTRAVENOUS AS NEEDED
Status: DISCONTINUED | OUTPATIENT
Start: 2018-02-21 | End: 2018-02-21 | Stop reason: HOSPADM

## 2018-02-21 RX ORDER — NALOXONE HYDROCHLORIDE 0.4 MG/ML
0.4 INJECTION, SOLUTION INTRAMUSCULAR; INTRAVENOUS; SUBCUTANEOUS AS NEEDED
Status: DISCONTINUED | OUTPATIENT
Start: 2018-02-21 | End: 2018-02-21 | Stop reason: HOSPADM

## 2018-02-21 RX ORDER — MIDAZOLAM HYDROCHLORIDE 1 MG/ML
INJECTION, SOLUTION INTRAMUSCULAR; INTRAVENOUS AS NEEDED
Status: DISCONTINUED | OUTPATIENT
Start: 2018-02-21 | End: 2018-02-21 | Stop reason: HOSPADM

## 2018-02-21 RX ORDER — DEXAMETHASONE SODIUM PHOSPHATE 4 MG/ML
INJECTION, SOLUTION INTRA-ARTICULAR; INTRALESIONAL; INTRAMUSCULAR; INTRAVENOUS; SOFT TISSUE AS NEEDED
Status: DISCONTINUED | OUTPATIENT
Start: 2018-02-21 | End: 2018-02-21 | Stop reason: HOSPADM

## 2018-02-21 RX ORDER — SODIUM CHLORIDE 0.9 % (FLUSH) 0.9 %
5-10 SYRINGE (ML) INJECTION AS NEEDED
Status: DISCONTINUED | OUTPATIENT
Start: 2018-02-21 | End: 2018-02-21 | Stop reason: HOSPADM

## 2018-02-21 RX ORDER — OXYCODONE AND ACETAMINOPHEN 5; 325 MG/1; MG/1
1-2 TABLET ORAL
Qty: 84 TAB | Refills: 0 | Status: SHIPPED | OUTPATIENT
Start: 2018-02-21 | End: 2018-02-28

## 2018-02-21 RX ORDER — GLYCOPYRROLATE 0.2 MG/ML
INJECTION INTRAMUSCULAR; INTRAVENOUS AS NEEDED
Status: DISCONTINUED | OUTPATIENT
Start: 2018-02-21 | End: 2018-02-21 | Stop reason: HOSPADM

## 2018-02-21 RX ORDER — OXYCODONE HYDROCHLORIDE 5 MG/1
5 TABLET ORAL
Status: COMPLETED | OUTPATIENT
Start: 2018-02-21 | End: 2018-02-21

## 2018-02-21 RX ORDER — NEOSTIGMINE METHYLSULFATE 5 MG/5 ML
SYRINGE (ML) INTRAVENOUS AS NEEDED
Status: DISCONTINUED | OUTPATIENT
Start: 2018-02-21 | End: 2018-02-21 | Stop reason: HOSPADM

## 2018-02-21 RX ORDER — FLUMAZENIL 0.1 MG/ML
0.2 INJECTION INTRAVENOUS
Status: DISCONTINUED | OUTPATIENT
Start: 2018-02-21 | End: 2018-02-21 | Stop reason: HOSPADM

## 2018-02-21 RX ORDER — SODIUM CHLORIDE, SODIUM LACTATE, POTASSIUM CHLORIDE, CALCIUM CHLORIDE 600; 310; 30; 20 MG/100ML; MG/100ML; MG/100ML; MG/100ML
100 INJECTION, SOLUTION INTRAVENOUS CONTINUOUS
Status: DISCONTINUED | OUTPATIENT
Start: 2018-02-21 | End: 2018-02-21 | Stop reason: HOSPADM

## 2018-02-21 RX ORDER — PROPOFOL 10 MG/ML
INJECTION, EMULSION INTRAVENOUS AS NEEDED
Status: DISCONTINUED | OUTPATIENT
Start: 2018-02-21 | End: 2018-02-21 | Stop reason: HOSPADM

## 2018-02-21 RX ORDER — CLOPIDOGREL BISULFATE 75 MG/1
75 TABLET ORAL DAILY
Qty: 90 TAB | Refills: 3 | Status: ON HOLD
Start: 2018-02-22 | End: 2022-09-12 | Stop reason: SDUPTHER

## 2018-02-21 RX ORDER — CEFAZOLIN SODIUM 2 G/50ML
2 SOLUTION INTRAVENOUS ONCE
Status: COMPLETED | OUTPATIENT
Start: 2018-02-21 | End: 2018-02-21

## 2018-02-21 RX ORDER — FENTANYL CITRATE 50 UG/ML
50 INJECTION, SOLUTION INTRAMUSCULAR; INTRAVENOUS
Status: DISCONTINUED | OUTPATIENT
Start: 2018-02-21 | End: 2018-02-21 | Stop reason: HOSPADM

## 2018-02-21 RX ORDER — ACETAMINOPHEN 10 MG/ML
1000 INJECTION, SOLUTION INTRAVENOUS ONCE
Status: COMPLETED | OUTPATIENT
Start: 2018-02-21 | End: 2018-02-21

## 2018-02-21 RX ORDER — SODIUM CHLORIDE, SODIUM LACTATE, POTASSIUM CHLORIDE, CALCIUM CHLORIDE 600; 310; 30; 20 MG/100ML; MG/100ML; MG/100ML; MG/100ML
125 INJECTION, SOLUTION INTRAVENOUS CONTINUOUS
Status: DISCONTINUED | OUTPATIENT
Start: 2018-02-21 | End: 2018-02-21 | Stop reason: HOSPADM

## 2018-02-21 RX ADMIN — FENTANYL CITRATE 50 MCG: 50 INJECTION, SOLUTION INTRAMUSCULAR; INTRAVENOUS at 09:50

## 2018-02-21 RX ADMIN — FENTANYL CITRATE 50 MCG: 50 INJECTION, SOLUTION INTRAMUSCULAR; INTRAVENOUS at 09:18

## 2018-02-21 RX ADMIN — ROCURONIUM BROMIDE 20 MG: 10 INJECTION, SOLUTION INTRAVENOUS at 09:11

## 2018-02-21 RX ADMIN — SODIUM CHLORIDE, SODIUM LACTATE, POTASSIUM CHLORIDE, AND CALCIUM CHLORIDE 100 ML/HR: 600; 310; 30; 20 INJECTION, SOLUTION INTRAVENOUS at 11:46

## 2018-02-21 RX ADMIN — ONDANSETRON 4 MG: 2 INJECTION INTRAMUSCULAR; INTRAVENOUS at 09:20

## 2018-02-21 RX ADMIN — CEFAZOLIN SODIUM 2 G: 2 SOLUTION INTRAVENOUS at 08:45

## 2018-02-21 RX ADMIN — SODIUM CHLORIDE, SODIUM LACTATE, POTASSIUM CHLORIDE, AND CALCIUM CHLORIDE 100 ML/HR: 600; 310; 30; 20 INJECTION, SOLUTION INTRAVENOUS at 07:28

## 2018-02-21 RX ADMIN — FENTANYL CITRATE 100 MCG: 50 INJECTION, SOLUTION INTRAMUSCULAR; INTRAVENOUS at 08:32

## 2018-02-21 RX ADMIN — PROPOFOL 150 MG: 10 INJECTION, EMULSION INTRAVENOUS at 08:32

## 2018-02-21 RX ADMIN — MIDAZOLAM HYDROCHLORIDE 2 MG: 1 INJECTION, SOLUTION INTRAMUSCULAR; INTRAVENOUS at 08:26

## 2018-02-21 RX ADMIN — LIDOCAINE HYDROCHLORIDE 80 MG: 20 INJECTION, SOLUTION EPIDURAL; INFILTRATION; INTRACAUDAL; PERINEURAL at 08:32

## 2018-02-21 RX ADMIN — SODIUM CHLORIDE, SODIUM LACTATE, POTASSIUM CHLORIDE, AND CALCIUM CHLORIDE: 600; 310; 30; 20 INJECTION, SOLUTION INTRAVENOUS at 10:00

## 2018-02-21 RX ADMIN — GLYCOPYRROLATE 0.4 MG: 0.2 INJECTION INTRAMUSCULAR; INTRAVENOUS at 10:14

## 2018-02-21 RX ADMIN — FENTANYL CITRATE 50 MCG: 50 INJECTION, SOLUTION INTRAMUSCULAR; INTRAVENOUS at 09:58

## 2018-02-21 RX ADMIN — DEXAMETHASONE SODIUM PHOSPHATE 4 MG: 4 INJECTION, SOLUTION INTRA-ARTICULAR; INTRALESIONAL; INTRAMUSCULAR; INTRAVENOUS; SOFT TISSUE at 09:20

## 2018-02-21 RX ADMIN — Medication 3 MG: at 10:14

## 2018-02-21 RX ADMIN — OXYCODONE HYDROCHLORIDE 5 MG: 5 TABLET ORAL at 11:46

## 2018-02-21 RX ADMIN — ROCURONIUM BROMIDE 50 MG: 10 INJECTION, SOLUTION INTRAVENOUS at 08:32

## 2018-02-21 RX ADMIN — ACETAMINOPHEN 1000 MG: 10 INJECTION, SOLUTION INTRAVENOUS at 09:07

## 2018-02-21 NOTE — PERIOP NOTES
TRANSFER - OUT REPORT:    Verbal report given to Madina Dupree RN (name) on Melecio Reynolds  being transferred to Phase 2 (unit) for routine progression of care       Report consisted of patients Situation, Background, Assessment and   Recommendations(SBAR). Information from the following report(s) SBAR, Kardex, OR Summary, Procedure Summary, Intake/Output, MAR and Recent Results was reviewed with the receiving nurse. Lines:   Peripheral IV 02/21/18 Left Hand (Active)   Site Assessment Clean, dry, & intact 2/21/2018 11:07 AM   Phlebitis Assessment 0 2/21/2018 11:07 AM   Infiltration Assessment 0 2/21/2018 11:07 AM   Dressing Status Clean, dry, & intact 2/21/2018 11:07 AM   Dressing Type Transparent 2/21/2018 11:07 AM   Hub Color/Line Status Green; Infusing 2/21/2018 11:07 AM        Opportunity for questions and clarification was provided.       Patient transported with:   [facility]

## 2018-02-21 NOTE — INTERVAL H&P NOTE
H&P Update:  Ajith Ramirez was seen and examined. History and physical has been reviewed. The patient has been examined.  There have been no significant clinical changes since the completion of the originally dated History and Physical.    Signed By: Tayler Fletcher MD     February 21, 2018 7:21 AM

## 2018-02-21 NOTE — PERIOP NOTES
Spoke to Patient's daughter, Roberto Yin, and awaiting call back regarding who will pick patient up from [facility].  [phone]

## 2018-02-21 NOTE — IP AVS SNAPSHOT
[address] [address] 
[phone] Patient: Mahesh Ragsdale MRN: [id number] About your hospitalization You were admitted on:  February 21, 2018 You last received care in the:   PACU You were discharged on:  February 21, 2018 Why you were hospitalized Your primary diagnosis was:  Not on File Your diagnoses also included:  Hnp (Herniated Nucleus Pulposus), Lumbar, Spinal Stenosis, Lumbar Follow-up Information Follow up With Details Comments Contact Info Benny Loza MD   [place] [address] [address] [facility] 
[phone] Stephanie Dobson MD Go in 10 day(s)  [address] [facility] [address] [place] [address] 
[phone] Discharge Orders None A check ed indicates which time of day the medication should be taken. My Medications START taking these medications Instructions Each Dose to Equal  
 Morning Noon Evening Bedtime  
 oxyCODONE-acetaminophen 5-325 mg per tablet Commonly known as:  PERCOCET Your last dose was: Your next dose is: Take 1-2 Tabs by mouth every four (4) hours as needed for Pain for up to 7 days. Max Daily Amount: 12 Tabs. 1-2 Tab CONTINUE taking these medications Instructions Each Dose to Equal  
 Morning Noon Evening Bedtime ASCORBYL PALMITATE (BULK) Your last dose was: Your next dose is:    
   
   
 by Does Not Apply route. aspirin 81 mg tablet Your last dose was: Your next dose is: Take 162 mg by mouth daily. 162 mg Cholecalciferol (Vitamin D3) 3,000 unit Tab Your last dose was: Your next dose is: Take 400 Units by mouth daily. 400 Units  
    
   
   
   
  
 clopidogrel 75 mg Tab Commonly known as:  PLAVIX Start taking on:  2/22/2018 Your last dose was: Your next dose is: Take 1 Tab by mouth daily. 75 mg  
    
   
   
   
  
 COQ10  100-100 mg-unit Cap Generic drug:  coenzyme q10-vitamin e Your last dose was: Your next dose is: Take  by mouth two (2) times a day. CURCUMIN Your last dose was: Your next dose is: Take 1 Cap by mouth two (2) times a day. 1 Cap  
    
   
   
   
  
 desmopressin 0.2 mg tablet Commonly known as:  DDAVP Your last dose was: Your next dose is: Take 0.2 mg by mouth daily as needed. Indications: up to 0.4 for urinary 0.2 mg  
    
   
   
   
  
 DHEA PO Your last dose was: Your next dose is: Take 50 mg by mouth daily. 50 mg Omega-3-DHA-EPA-Fish Oil 1,000 mg (120 mg-180 mg) Cap Your last dose was: Your next dose is: Take  by mouth daily. SELENIUM PO Your last dose was: Your next dose is: Take 1 Cap by mouth every Monday, Wednesday, Friday. 1 Cap * testosterone 30 mg/actuation (1.5 mL) Slpm  
Commonly known as:  Claire Sample Your last dose was: Your next dose is:    
   
   
 2 Actuation(s) by TransDERmal route daily. 2 Actuation(s) * testosterone 20.25 mg/1.25 gram (1.62 %) gel Commonly known as:  ANDROGEL Your last dose was: Your next dose is:    
   
   
 Apply 1 Spray to affected area daily. Max Daily Amount: 20.25 mg. 2 pumps daily 20.25 mg  
    
   
   
   
  
 VITAMIN C 500 mg tablet Generic drug:  ascorbic acid (vitamin C) Your last dose was: Your next dose is: Take 500 mg by mouth daily. 500 mg  
    
   
   
   
  
 vitamin e 400 unit Tab Your last dose was: Your next dose is: Take  by mouth daily. * Notice: This list has 2 medication(s) that are the same as other medications prescribed for you. Read the directions carefully, and ask your doctor or other care provider to review them with you. STOP taking these medications   
 melatonin 3 mg tablet Where to Get Your Medications Information on where to get these meds will be given to you by the nurse or doctor. ! Ask your nurse or doctor about these medications  
  clopidogrel 75 mg Tab  
 oxyCODONE-acetaminophen 5-325 mg per tablet Discharge Instructions WBAT. Change dressing in 3 days. Do not get incision wet x 5 days. Restart Plavix and ASA tomorrow Thursday 2/22. No lifting over 20 pounds. Take stool softeners daily while taking pain medications, since pain medicines are constipating. Regular diet as tolerated. Increase fluid intake at home. DISCHARGE SUMMARY from Nurse PATIENT INSTRUCTIONS: 
 
After general anesthesia or intravenous sedation, for 24 hours or while taking prescription Narcotics: · Limit your activities · Do not drive and operate hazardous machinery · Do not make important personal or business decisions · Do  not drink alcoholic beverages · If you have not urinated within 8 hours after discharge, please contact your surgeon on call. Report the following to your surgeon: 
· Excessive pain, swelling, redness or odor of or around the surgical area · Temperature over 100.5 · Nausea and vomiting lasting longer than 4 hours or if unable to take medications · Any signs of decreased circulation or nerve impairment to extremity: change in color, persistent  numbness, tingling, coldness or increase pain · Any questions What to do at Home: 
Recommended activity: Activity as tolerated.  
 
If you experience any of the following symptoms fever greater than 101.0, chills, nausea or vomiting lasting longer than 4-hours, pain not relieved with medication, circulation changes, please follow up with Dr. Adonay Luciano. *  Please give a list of your current medications to your Primary Care Provider. *  Please update this list whenever your medications are discontinued, doses are 
    changed, or new medications (including over-the-counter products) are added. *  Please carry medication information at all times in case of emergency situations. These are general instructions for a healthy lifestyle: No smoking/ No tobacco products/ Avoid exposure to second hand smoke Surgeon General's Warning:  Quitting smoking now greatly reduces serious risk to your health. Obesity, smoking, and sedentary lifestyle greatly increases your risk for illness A healthy diet, regular physical exercise & weight monitoring are important for maintaining a healthy lifestyle You may be retaining fluid if you have a history of heart failure or if you experience any of the following symptoms:  Weight gain of 3 pounds or more overnight or 5 pounds in a week, increased swelling in our hands or feet or shortness of breath while lying flat in bed. Please call your doctor as soon as you notice any of these symptoms; do not wait until your next office visit. Recognize signs and symptoms of STROKE: 
 
F-face looks uneven A-arms unable to move or move unevenly S-speech slurred or non-existent T-time-call 911 as soon as signs and symptoms begin-DO NOT go Back to bed or wait to see if you get better-TIME IS BRAIN. Warning Signs of HEART ATTACK Call 911 if you have these symptoms: 
? Chest discomfort. Most heart attacks involve discomfort in the center of the chest that lasts more than a few minutes, or that goes away and comes back. It can feel like uncomfortable pressure, squeezing, fullness, or pain. ? Discomfort in other areas of the upper body.  Symptoms can include pain or discomfort in one or both arms, the back, neck, jaw, or stomach. ? Shortness of breath with or without chest discomfort. ? Other signs may include breaking out in a cold sweat, nausea, or lightheadedness. Don't wait more than five minutes to call [address]! Fast action can save your life. Calling 911 is almost always the fastest way to get lifesaving treatment. Emergency Medical Services staff can begin treatment when they arrive  up to an hour sooner than if someone gets to the hospital by car. The discharge information has been reviewed with the patient and caregiver. The patient and caregiver verbalized understanding. Discharge medications reviewed with the patient and caregiver and appropriate educational materials and side effects teaching were provided. Patient armband removed and shredded. ___________________________________________________________________________________________________________________________________ ACO Transitions of Care Introducing [facility] [address] Janene Galarza offers a voluntary care coordination program to provide high quality service and care to [facility] fee-for-service beneficiaries. Vern Galo was designed to help you enhance your health and well-being through the following services: ? Transitions of Care  support for individuals who are transitioning from one care setting to another (example: Hospital to home). ? Chronic and Complex Care Coordination  support for individuals and caregivers of those with serious or chronic illnesses or with more than one chronic (ongoing) condition and those who take a number of different medications. If you meet specific medical criteria, a [facility] [address] may call you directly to coordinate your care with your primary care physician and your other care providers. For questions about the [facility] programs, please, contact your physicians office. For general questions or additional information about Accountable Care Organizations: 
Please visit www.medicare.gov/acos. html or call 1-800-MEDICARE (0-616.828.7221) TTY users should call 6-946.507.7938. Introducing [facility]! Dear Gloria Yates: Thank you for requesting a Augmate account. Our records indicate that you already have an active Augmate account. You can access your account anytime at [email or web address] Did you know that you can access your hospital and ER discharge instructions at any time in Augmate? You can also review all of your test results from your hospital stay or ER visit. Additional Information If you have questions, please visit the Frequently Asked Questions section of the Augmate website at [email or web address] Remember, Augmate is NOT to be used for urgent needs. For medical emergencies, dial 911. Now available from your iPhone and Android! Providers Seen During Your Hospitalization Provider Specialty Primary office phone Burnice Buerger, [place] [facility] [phone] Your Primary Care Physician (PCP) Primary Care Physician Office Phone Office Fax Beltran Cavanaugh [phone] You are allergic to the following No active allergies Recent Documentation Height Weight BMI Smoking Status 1.816 m 108.6 kg 32.93 kg/m2 Never Smoker Emergency Contacts Name Discharge Info Relation Home Work Mobile [address] CAREGIVER [3] Life Partner [7]   [phone] [facility] DISCHARGE CAREGIVER [3] Daughter [21] [phone] Patient Belongings  The following personal items are in your possession at time of discharge: 
  Dental Appliances: None  Visual Aid: None      Home Medications: None Clothing: Pants, Shirt, Socks, Undergarments, Footwear, Belt (locker #15. back brace, eye drops, cold pac, cane)    Other Valuables: Cell Phone, Mitcheal Miranda (and watch with security) Please provide this summary of care documentation to your next provider. Signatures-by signing, you are acknowledging that this After Visit Summary has been reviewed with you and you have received a copy. Patient Signature:  ____________________________________________________________ Date:  ____________________________________________________________  
  
Devota Dandy Provider Signature:  ____________________________________________________________ Date:  ____________________________________________________________

## 2018-02-21 NOTE — PERIOP NOTES
Have been unable to reach family member. Patient stated that if he can get to his cellphone he can reach family. Called security.

## 2018-02-21 NOTE — DISCHARGE INSTRUCTIONS
WBAT.   Change dressing in 3 days. Do not get incision wet x 5 days. Restart Plavix and ASA tomorrow Thursday 2/22. No lifting over 20 pounds. Take stool softeners daily while taking pain medications, since pain medicines are constipating. Regular diet as tolerated. Increase fluid intake at home. DISCHARGE SUMMARY from Nurse    PATIENT INSTRUCTIONS:    After general anesthesia or intravenous sedation, for 24 hours or while taking prescription Narcotics:  · Limit your activities  · Do not drive and operate hazardous machinery  · Do not make important personal or business decisions  · Do  not drink alcoholic beverages  · If you have not urinated within 8 hours after discharge, please contact your surgeon on call. Report the following to your surgeon:  · Excessive pain, swelling, redness or odor of or around the surgical area  · Temperature over 100.5  · Nausea and vomiting lasting longer than 4 hours or if unable to take medications  · Any signs of decreased circulation or nerve impairment to extremity: change in color, persistent  numbness, tingling, coldness or increase pain  · Any questions    What to do at Home:  Recommended activity: Activity as tolerated. If you experience any of the following symptoms fever greater than 101.0, chills, nausea or vomiting lasting longer than 4-hours, pain not relieved with medication, circulation changes, please follow up with Dr. aHlima Clayton. *  Please give a list of your current medications to your Primary Care Provider. *  Please update this list whenever your medications are discontinued, doses are      changed, or new medications (including over-the-counter products) are added. *  Please carry medication information at all times in case of emergency situations.     These are general instructions for a healthy lifestyle:    No smoking/ No tobacco products/ Avoid exposure to second hand smoke  Surgeon General's Warning:  Quitting smoking now greatly reduces serious risk to your health. Obesity, smoking, and sedentary lifestyle greatly increases your risk for illness    A healthy diet, regular physical exercise & weight monitoring are important for maintaining a healthy lifestyle    You may be retaining fluid if you have a history of heart failure or if you experience any of the following symptoms:  Weight gain of 3 pounds or more overnight or 5 pounds in a week, increased swelling in our hands or feet or shortness of breath while lying flat in bed. Please call your doctor as soon as you notice any of these symptoms; do not wait until your next office visit. Recognize signs and symptoms of STROKE:    F-face looks uneven    A-arms unable to move or move unevenly    S-speech slurred or non-existent    T-time-call 911 as soon as signs and symptoms begin-DO NOT go       Back to bed or wait to see if you get better-TIME IS BRAIN. Warning Signs of HEART ATTACK     Call 911 if you have these symptoms:   Chest discomfort. Most heart attacks involve discomfort in the center of the chest that lasts more than a few minutes, or that goes away and comes back. It can feel like uncomfortable pressure, squeezing, fullness, or pain.  Discomfort in other areas of the upper body. Symptoms can include pain or discomfort in one or both arms, the back, neck, jaw, or stomach.  Shortness of breath with or without chest discomfort.  Other signs may include breaking out in a cold sweat, nausea, or lightheadedness. Don't wait more than five minutes to call 911 - MINUTES MATTER! Fast action can save your life. Calling 911 is almost always the fastest way to get lifesaving treatment. Emergency Medical Services staff can begin treatment when they arrive -- up to an hour sooner than if someone gets to the hospital by car. The discharge information has been reviewed with the patient and caregiver. The patient and caregiver verbalized understanding.   Discharge medications reviewed with the patient and caregiver and appropriate educational materials and side effects teaching were provided. Patient armband removed and shredded.     ___________________________________________________________________________________________________________________________________

## 2018-02-21 NOTE — BRIEF OP NOTE
BRIEF OPERATIVE NOTE    Date of Procedure: 2/21/2018   Preoperative Diagnosis: Spinal stenosis of lumbar region, unspecified whether neurogenic claudication present [M48.061]  Postoperative Diagnosis: Spinal stenosis of lumbar region, unspecified whether neurogenic claudication present [M48.061]    Procedure: Procedure(s):  RIGHT LUMBAR 3-4-4-5 LAMINECTOMIES **SPEC POP**   Surgeon(s) and Role:     * Tiffany Sharma MD - Primary  Assistant: Collins Angelucci PA-C  Anesthesia: General   Estimated Blood Loss: 30cc  Specimens: * No specimens in log *   Findings:spinal stenosis, DDD, HNP right J2-8 and G7-9  Complications: none  Implants: * No implants in log *

## 2018-02-21 NOTE — OP NOTES
Patient: Jesse Leung MRN: [id number]  SSN: [id number]    YOB: 1945  Age: 74 y.o. Sex: male      Date of Procedure: 2/21/2018   Preoperative Diagnosis: Spinal stenosis of lumbar region, unspecified whether neurogenic claudication present [M48.061]  Postoperative Diagnosis: Spinal stenosis of lumbar region, unspecified whether neurogenic claudication present [M48.061]    Procedure: Procedure(s):  RIGHT LUMBAR 3-4-4-5 LAMINECTOMIES **SPEC POP**   Surgeon(s) and Role:     * Jose Guadalupe Dominique MD - Primary  Assistant: Darien Tolbert PA-C  Anesthesia: General   Estimated Blood Loss: 50cc  Specimens: * No specimens in log *   Findings: foraminal stenosis   Complications: none      Indications: This is a 74y.o. year-old male who presents with significant back   and bilateral lower extremity pain, worsening ability to do activities of   daily living. X-rays and MRI scan revealing severe spinal stenosis,   degenerative disk disease and disk herniation. Having having failed conservative care, comes now for operative intervention. DESCRIPTION OF PROCEDURE: After correct identification, the patient was   taken to the operating room, placed supine on the table, general   endotracheal anesthesia induced. 2grams of Ancef was given. The patient was then rotated prone onto the Amanda Estimable table. Lumbar spine was prepped and draped in the usual sterile fashion. Midline incision was made in the lumbar   spine, taken down to the spinous processes of L3-5. The right side posterior   Lamina bilaterally were expose at L3-5 as seen on intraoperative   fluoroscopy. Gelpi retractors were then placed. The wound was then irrigated. Laminectomy was then performed at the inferior half of L3 as well as the superior half of L4 as well as the inferior   half of L4 and the superior half of L5. This provided excellent midline   decompression.  Foraminotomies were then performed on the right at L3-4 and L4-5 until a [employer] 3 was easily passed through each of the neural foramen. This allowed visualization of the L3, L4 and L5 nerve root. The wound   was then irrigated. Fascia was then closed using #1 Vicryl suture. Subcutaneous tissue   approximated with 2-0 Vicryl suture. Skin approximated with staples. Sterile dressing was applied. The patient tolerated the procedure well and taken to Recovery room in good   condition.

## 2018-02-21 NOTE — ANESTHESIA POSTPROCEDURE EVALUATION
Post-Anesthesia Evaluation and Assessment    Cardiovascular Function/Vital Signs  Visit Vitals    /80 (BP 1 Location: Right arm, BP Patient Position: At rest)    Pulse 72    Temp 36.5 °C (97.7 °F)    Resp 15    Ht 5' 11.5\" (1.816 m)    Wt 108.6 kg (239 lb 7 oz)    SpO2 98%    BMI 32.93 kg/m2       Patient is status post Procedure(s):  RIGHT LUMBAR 3-4-4-5 LAMINECTOMIES **SPEC POP** . Nausea/Vomiting: Controlled. Postoperative hydration reviewed and adequate. Pain:  Pain Scale 1: Numeric (0 - 10) (02/21/18 1255)  Pain Intensity 1: 2 (02/21/18 1255)   Managed. Neurological Status:   Neuro (WDL): Within Defined Limits (02/21/18 1255)   At baseline. Mental Status and Level of Consciousness: Arousable. Pulmonary Status:   O2 Device: Room air (02/21/18 1255)   Adequate oxygenation and airway patent. Complications related to anesthesia: None    Post-anesthesia assessment completed. Magnet removed with return of rate to 72-76 BPM in PACU. Patient has met all discharge requirements.     Signed By: Rigo Hsieh MD    February 21, 2018

## 2018-02-21 NOTE — PROGRESS NOTES
Problem: Mobility Impaired (Adult and Pediatric)  Goal: *Acute Goals and Plan of Care (Insert Text)  Physical Therapy Goals LT/ST  Initiated 2/21/2018 and to be accomplished within 3-5 day(s)  1. Patient will move from supine <> sit with S in prep for out of bed activity and change of position. 2.  Patient will perform sit<> stand with S with LRAD in prep for transfers/ambulation. 3.  Patient will transfer from bed <> chair with S with LRAD for time up in chair for completion of ADL activity. 4.  Patient will ambulate 150 feet with LRAD/S for improved functional mobility/safe discharge. .   5.  Patient will ascend/descend 3-5 stairs with B/L handrail with minimal assistance/contact guard assist for home re-entry as needed. Outcome: Progressing Towards Goal  physical Therapy EVALUATION    Patient: Mary Ellen Farr (89 y.o. male)  Date: 2/21/2018  Primary Diagnosis: LUMBAR STENOSIS, LUMBAGO, HISTORY OF CARDIOVASCULAR DISEASE, REFLUX  Procedure(s) (LRB):  RIGHT LUMBAR 3-4-4-5 LAMINECTOMIES **SPEC POP**  (Right) Day of Surgery   Precautions: Fall    ASSESSMENT :  Based on the objective data described below, the patient presents with decrease independence in bed mobility, transfers, gait, and step negotiation. Pt seen in recliner prior to session w/ nurse present in the room. Pt has no c/o lightheadedness or dizziness at this time. Pt educated on how to properly don/ doff LSO. Once in standing pt adamant on using [facility] however pt does not demonstrate proper stability w/ AD so a RW administered to pt. Pt demonstrates increase stability w/ RW but demonstrates a wide BRENDA, lateral weight shifting, shuffling gait, decrease ability to properly clear B/L LE for proper step clearance, lateral weight shifting, decrease stride length, and decrease juana. Pt reported B/L LE weakness while ambulating but able to maintain upright standing.  Pt able to perform step negotiation using box step w/ RW w/o any difficulty and min VCing. Pt left sitting in recliner chair after session, w/ no c/o lightheadedness or dizziness, nurse notified of session. Patient will benefit from skilled intervention to address the above impairments. Patients rehabilitation potential is considered to be Fair  Factors which may influence rehabilitation potential include:   []         None noted  []         Mental ability/status  [x]         Medical condition  [x]         Home/family situation and support systems  [x]         Safety awareness  [x]         Pain tolerance/management  []         Other:      PLAN :  Recommendations and Planned Interventions:  [x]           Bed Mobility Training             [x]    Neuromuscular Re-Education  [x]           Transfer Training                   []    Orthotic/Prosthetic Training  [x]           Gait Training                          []    Modalities  [x]           Therapeutic Exercises          []    Edema Management/Control  [x]           Therapeutic Activities            [x]    Patient and Family Training/Education  []           Other (comment):    Frequency/Duration: Patient will be followed by physical therapy twice daily to address goals. Discharge Recommendations: Home Health  Further Equipment Recommendations for Discharge: rolling walker     SUBJECTIVE:   Patient stated I know what to do, I've been dealing with this for a long time.     OBJECTIVE DATA SUMMARY:     Past Medical History:   Diagnosis Date    (QFT) QuantiFERON-TB test reaction without active tuberculosis     Arthritis     Ataxia 7/13/2013    Ataxia, late effect of cerebrovascular disease     CAD (coronary artery disease)     Carotid artery disease ([address] 7/31/2013    Carotid bruit 7/13/2013    Cognitive deficits, late effect of cerebrovascular disease     Complete heart block ([address] 6/14/2013    Complete heart block ([address] 6/14/2013    Concussion     *9    Coronary atherosclerosis of native coronary artery 6/14/2013    Diabetes insipidus ([facility] 7/18/2017    Fractured rib 2014    Hypogonadism male 2/20/2014    Normal pressure hydrocephalus 7/13/2013    Other testicular hypofunction     Pacemaker     Pacemaker malfunction 7/29/2013    Peripheral neuropathy 7/13/2013    S/P ventriculoperitoneal shunt 7/13/2013    Spinal stenosis     Third degree AV block ([facility] 12/16/2013    Transient ischemic attack 7/31/2013    Transient ischemic attack 7/31/2013    Unspecified disorder of muscle, ligament, and fascia     Unspecified hereditary and idiopathic peripheral neuropathy     Unspecified intestinal malabsorption      Past Surgical History:   Procedure Laterality Date    BRAIN SHUNT TUBE/RES[facility] INJECTN  [facility]    HX APPENDECTOMY      [de-identified] years old    HX CORONARY ARTERY BYPASS GRAFT  2003    Dr. Monica Pierce  2008    abdominal a couple     HX OTHER SURGICAL      pierce spenser    HX PACEMAKER      HX UROLOGICAL      cystoscopy    [place] REMOVAL PERMANENT PACEMAKER PULSE GENERATOR ONLY  12/16/2013         REMOVAL PACER LEADS DUAL  12/16/2013          Barriers to Learning/Limitations: yes;  physical  Compensate with: Verbal Cues and Tactile Cues  Prior Level of Function/Home Situation:   Home Situation  Home Environment: Private residence  # Steps to Enter: 5  Rails to Enter: Yes  Hand Rails : Bilateral  One/Two Story Residence: Two story, live on 1st floor  Living Alone: No  Support Systems: Spouse/Significant Other/Partner  Patient Expects to be Discharged to[de-identified] Private residence  Current DME Used/Available at Home: Cane, straight  Critical Behavior:  Neurologic State: Alert; Appropriate for age  Orientation Level: Oriented to time  Cognition: Appropriate decision making  Skin Condition/Temp: Warm;Dry  Skin Integrity: Incision (comment) (to lower back)  Skin Integumentary  Skin Color: Appropriate for ethnicity  Skin Condition/Temp: Warm;Dry  Skin Integrity: Incision (comment) (to lower back)  Turgor: Non-tenting  Strength:    Strength: Generally decreased, functional  Range Of Motion:  AROM: Generally decreased, functional  PROM: Generally decreased, functional  Functional Mobility:  Transfers:  Sit to Stand: Contact guard assistance  Stand to Sit: Contact guard assistance  Balance:   Sitting: Intact  Standing: Impaired; With support  Standing - Static: Fair  Standing - Dynamic : Fair  Ambulation/Gait Training:  Distance (ft): 80 Feet (ft)  Assistive Device: Brace/Splint;Gait belt;Walker, rolling  Ambulation - Level of Assistance: Contact guard assistance  Gait Description (WDL): Exceptions to WDL  Gait Abnormalities: Decreased step clearance;Shuffling gait  Base of Support: Widened  Stance: Weight shift  Speed/Ysabel: Shuffled; Slow  Step Length: Left shortened;Right shortened  Swing Pattern: Left asymmetrical;Right asymmetrical  Stairs:  Number of Stairs Trained: 2  Stairs - Level of Assistance: Contact guard assistance  Rail Use: None (RW)  Pain:  Pain Scale 1: Numeric (0 - 10)  Pain Intensity 1: 2  Pain Location 1: Back  Pain Orientation 1: Lower  Pain Description 1: Sore  Pain Intervention(s) 1: Declines  Activity Tolerance:   Fair  Please refer to the flowsheet for vital signs taken during this treatment. After treatment:   [x]         Patient left in no apparent distress sitting up in chair  []         Patient left in no apparent distress in bed  [x]         Call bell left within reach  [x]         Nursing notified  []         Caregiver present  []         Bed alarm activated    COMMUNICATION/EDUCATION:   [x]         Fall prevention education was provided and the patient/caregiver indicated understanding. [x]         Patient/family have participated as able in goal setting and plan of care. [x]         Patient/family agree to work toward stated goals and plan of care. []         Patient understands intent and goals of therapy, but is neutral about his/her participation.   []         Patient is unable to participate in goal setting and plan of care. Thank you for this referral.  Madison Magallon, PT   Time Calculation: 18 mins     Mobility:  Current  CI= 1-19%   Goal  CI= 1-19%. The severity rating is based on the Other Based on functional assessment

## 2018-02-21 NOTE — ANESTHESIA PREPROCEDURE EVALUATION
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 Pt with normal pressure hydrocephalus for which he has a  shunt in place and functional.  He is asymptomatic from his hydrocephalus. Cardiovascular            Dysrhythmias (h/o 3rd degree heart block with underlying ventricular rate of 39 (asymptomatic per patient); ECG shows dual AV pacing at 76 BPM.  Magnet response is dual chamber asynchronus pacing at  BPM.)   CAD (s/p 5V CABG 20+ years ago; asymptomatic, followed by Dr. Sobeida Ramos)  Pertinent negatives: No angina  Exercise tolerance: <4 METS: Due to ataxia and back problems; seen by cardiologist 12/17 and not due for follow-up until 4/18     GI/Hepatic/Renal     GERD (due to gluten intolerance per patient; tx with dietary avoidance): well controlled    Renal disease (h/o nephrolithiasis): stones    Pertinent negatives: No liver disease   Endo/Other        Morbid obesity and arthritis  Pertinent negatives: No diabetes (pt with diabetes insipidus.   takes desmopressin daily and took dose this AM)   Other Findings              Physical Exam    Airway  Mallampati: III  TM Distance: < 4 cm  Neck ROM: decreased range of motion   Mouth opening: Normal     Cardiovascular    Rhythm: regular  Rate: normal         Dental    Dentition: Poor dentition     Pulmonary  Breath sounds clear to auscultation               Abdominal  GI exam deferred       Other Findings            Anesthetic Plan    ASA: 3  Anesthesia type: general          Induction: Intravenous  Anesthetic plan and risks discussed with: Patient      Plan GETA. Pt with increased risk due to multiple chronic underlying conditions especially his h/o embolic CVA's and has been off his plavix for 7 days and will be in a hypercoagulable state perioperatively. 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 Dr. Hernandez Seen accepts all risks and agrees to proceed. Since underlying rhythm is CHB HR 39 and patient will be in prone position with limited access to pacemaker implant, will plan on placing magnet on pacemaker prior to prone positioning. [facility] contacted and states magnet causes asynchronous pacing at  BPM.  2/9/18 interrogation note states pacemaker is functioning appropriately with estimated battery life 7.6-8.3 years. Plan Pineda or [facility] laryngoscopy. Multiple surgeries in the past with no h/o difficult airway.

## 2021-05-26 ENCOUNTER — TRANSCRIBE ORDER (OUTPATIENT)
Dept: REGISTRATION | Age: 76
End: 2021-05-26

## 2021-05-26 ENCOUNTER — HOSPITAL ENCOUNTER (OUTPATIENT)
Dept: PREADMISSION TESTING | Age: 76
Discharge: HOME | End: 2021-05-26
Payer: MEDICARE

## 2021-05-26 DIAGNOSIS — M12.9 ACUTE ARTHROPATHY: Primary | ICD-10-CM

## 2021-05-26 DIAGNOSIS — M12.9 ACUTE ARTHROPATHY: ICD-10-CM

## 2021-05-26 DIAGNOSIS — Z01.812 BLOOD TESTS PRIOR TO TREATMENT OR PROCEDURE: ICD-10-CM

## 2021-05-26 DIAGNOSIS — M51.27 LUMBAGO-SCIATICA DUE TO DISPLACEMENT OF LUMBAR INTERVERTEBRAL DISC: ICD-10-CM

## 2021-05-26 DIAGNOSIS — H35.52: ICD-10-CM

## 2021-05-26 DIAGNOSIS — M47.26 OTHER SPONDYLOSIS WITH RADICULOPATHY, LUMBAR REGION: ICD-10-CM

## 2021-05-26 DIAGNOSIS — M54.50 LUMBAGO: ICD-10-CM

## 2021-05-26 PROBLEM — M48.062 SPINAL STENOSIS OF LUMBAR REGION WITH NEUROGENIC CLAUDICATION: Status: ACTIVE | Noted: 2018-02-12

## 2021-05-26 PROBLEM — M48.061 LUMBAR FORAMINAL STENOSIS: Status: ACTIVE | Noted: 2021-05-26

## 2021-05-26 LAB
ALBUMIN SERPL-MCNC: 3.9 G/DL (ref 3.4–5)
ALBUMIN/GLOB SERPL: 1.3 {RATIO} (ref 0.8–1.7)
ALP SERPL-CCNC: 87 U/L (ref 45–117)
ALT SERPL-CCNC: 28 U/L (ref 16–61)
ANION GAP SERPL CALC-SCNC: 6 MMOL/L (ref 3–18)
AST SERPL-CCNC: 17 U/L (ref 10–38)
BILIRUB SERPL-MCNC: 0.8 MG/DL (ref 0.2–1)
BUN SERPL-MCNC: 22 MG/DL (ref 7–18)
BUN/CREAT SERPL: 22 (ref 12–20)
CALCIUM SERPL-MCNC: 9 MG/DL (ref 8.5–10.1)
CHLORIDE SERPL-SCNC: 106 MMOL/L (ref 100–111)
CO2 SERPL-SCNC: 25 MMOL/L (ref 21–32)
CREAT SERPL-MCNC: 0.99 MG/DL (ref 0.6–1.3)
ERYTHROCYTE [DISTWIDTH] IN BLOOD BY AUTOMATED COUNT: 12.6 % (ref 11.6–14.5)
GLOBULIN SER CALC-MCNC: 3 G/DL (ref 2–4)
GLUCOSE SERPL-MCNC: 109 MG/DL (ref 74–99)
HCT VFR BLD AUTO: 46.7 % (ref 36–48)
HGB BLD-MCNC: 16.1 G/DL (ref 13–16)
MCH RBC QN AUTO: 32.5 PG (ref 24–34)
MCHC RBC AUTO-ENTMCNC: 34.5 G/DL (ref 31–37)
MCV RBC AUTO: 94.2 FL (ref 74–97)
PLATELET # BLD AUTO: 157 K/UL (ref 135–420)
PMV BLD AUTO: 8.9 FL (ref 9.2–11.8)
POTASSIUM SERPL-SCNC: 4.4 MMOL/L (ref 3.5–5.5)
PROT SERPL-MCNC: 6.9 G/DL (ref 6.4–8.2)
RBC # BLD AUTO: 4.96 M/UL (ref 4.35–5.65)
SODIUM SERPL-SCNC: 137 MMOL/L (ref 136–145)
WBC # BLD AUTO: 7.2 K/UL (ref 4.6–13.2)

## 2021-05-26 PROCEDURE — 93005 ELECTROCARDIOGRAM TRACING: CPT

## 2021-05-26 PROCEDURE — 36415 COLL VENOUS BLD VENIPUNCTURE: CPT

## 2021-05-26 PROCEDURE — 80053 COMPREHEN METABOLIC PANEL: CPT

## 2021-05-26 PROCEDURE — 85027 COMPLETE CBC AUTOMATED: CPT

## 2021-05-28 LAB
ATRIAL RATE: 87 BPM
BACTERIA SPEC CULT: NORMAL
BACTERIA SPEC CULT: NORMAL
CALCULATED P AXIS, ECG09: 68 DEGREES
CALCULATED R AXIS, ECG10: -162 DEGREES
CALCULATED T AXIS, ECG11: 20 DEGREES
DIAGNOSIS, 93000: NORMAL
P-R INTERVAL, ECG05: 206 MS
Q-T INTERVAL, ECG07: 434 MS
QRS DURATION, ECG06: 172 MS
QTC CALCULATION (BEZET), ECG08: 522 MS
SERVICE CMNT-IMP: NORMAL
VENTRICULAR RATE, ECG03: 87 BPM

## 2021-08-20 ENCOUNTER — HOSPITAL ENCOUNTER (OUTPATIENT)
Dept: PREADMISSION TESTING | Age: 76
Discharge: HOME | End: 2021-08-20

## 2021-08-20 VITALS — BODY MASS INDEX: 32.21 KG/M2 | HEIGHT: 70 IN | WEIGHT: 225 LBS

## 2021-08-20 RX ORDER — CIPROFLOXACIN 500 MG/1
500 TABLET ORAL 2 TIMES DAILY
COMMUNITY
Start: 2021-08-17 | End: 2021-08-22

## 2021-08-20 RX ORDER — SODIUM CHLORIDE, SODIUM LACTATE, POTASSIUM CHLORIDE, CALCIUM CHLORIDE 600; 310; 30; 20 MG/100ML; MG/100ML; MG/100ML; MG/100ML
125 INJECTION, SOLUTION INTRAVENOUS CONTINUOUS
Status: CANCELLED | OUTPATIENT
Start: 2021-08-20

## 2021-08-20 RX ORDER — CHOLECALCIFEROL (VITAMIN D3) 50 MCG
CAPSULE ORAL DAILY
COMMUNITY

## 2021-08-20 RX ORDER — CELECOXIB 200 MG/1
200 CAPSULE ORAL DAILY
COMMUNITY
Start: 2021-08-17 | End: 2022-08-30 | Stop reason: CLARIF

## 2021-08-20 RX ORDER — SUCRALFATE 1 G/1
1 TABLET ORAL
COMMUNITY

## 2021-08-20 RX ORDER — CHOLECALCIFEROL (VITAMIN D3) 125 MCG
CAPSULE ORAL EVERY 6 HOURS
COMMUNITY
End: 2022-08-30 | Stop reason: CLARIF

## 2021-08-20 RX ORDER — CEFAZOLIN SODIUM/WATER 2 G/20 ML
2 SYRINGE (ML) INTRAVENOUS ONCE
Status: CANCELLED | OUTPATIENT
Start: 2021-08-20 | End: 2021-08-20

## 2021-08-20 RX ORDER — AMOXICILLIN AND CLAVULANATE POTASSIUM 875; 125 MG/1; MG/1
1 TABLET, FILM COATED ORAL EVERY 12 HOURS
COMMUNITY
Start: 2021-08-17 | End: 2022-08-30 | Stop reason: CLARIF

## 2021-08-20 NOTE — PERIOP NOTES
Patient educated on NPO, CHG, and current COVID 19 protocols. Patient states   he is fully vaccinated. Patient states he will bring vaccination record to [facility] by 9/3/2021. Patient to have labs by 8/23/2021. Medications reviewed. Patient has a [facility] [employer] pacemaker. Patient states he is currently undergoing wound care for a wound on his left tibia to include multiple antibiotics. Patient educated on current visitation policies. Patient advised to leave valuables at home or with a loved one. All questions answered by RN. Preoperative Nutrition Screen (EDGAR)   Patient's Age: 66 y.o. Patient's BMI: Estimated body mass index is 32.28 kg/m² as calculated from the following:    Height as of this encounter: 5' 10\" (1.778 m). Weight as of this encounter: 102.1 kg (225 lb). 1. Does the patient have a documented serum albumin less than 3.0 within the last 90 days? No = 0          2. Is patient's BMI less than 18.5 (or less than 20 if age over 72)? No = 0        3. Has the patient had an unplanned weight loss of 10% of body weight or more in the last 6 months? No = 0   4. Has the patient been eating less than 50% of their normal diet in the preceding week?  No = 0   EDGAR Score (number of Yes responses), 0-4 0       Electronically signed by Gregoria Mckoy on 8/20/21 at 4:34 PM

## 2021-08-24 ENCOUNTER — HOSPITAL ENCOUNTER (OUTPATIENT)
Dept: PREADMISSION TESTING | Age: 76
Discharge: HOME | End: 2021-08-24
Payer: MEDICARE

## 2021-08-24 LAB
ATRIAL RATE: 72 BPM
CALCULATED P AXIS, ECG09: 103 DEGREES
CALCULATED R AXIS, ECG10: -87 DEGREES
CALCULATED T AXIS, ECG11: 35 DEGREES
DIAGNOSIS, 93000: NORMAL
P-R INTERVAL, ECG05: 178 MS
Q-T INTERVAL, ECG07: 466 MS
QRS DURATION, ECG06: 180 MS
QTC CALCULATION (BEZET), ECG08: 510 MS
VENTRICULAR RATE, ECG03: 72 BPM

## 2021-08-24 PROCEDURE — 93005 ELECTROCARDIOGRAM TRACING: CPT

## 2021-08-25 LAB
BACTERIA SPEC CULT: NORMAL
BACTERIA SPEC CULT: NORMAL
SERVICE CMNT-IMP: NORMAL

## 2022-03-18 PROBLEM — E23.2 DIABETES INSIPIDUS (HCC): Status: ACTIVE | Noted: 2017-07-18

## 2022-03-19 PROBLEM — R31.0 GROSS HEMATURIA: Status: ACTIVE | Noted: 2017-12-26

## 2022-03-19 PROBLEM — M48.062 SPINAL STENOSIS OF LUMBAR REGION WITH NEUROGENIC CLAUDICATION: Status: ACTIVE | Noted: 2018-02-12

## 2022-03-19 PROBLEM — Z80.42 FAMILY HISTORY OF PROSTATE CANCER: Status: ACTIVE | Noted: 2017-07-18

## 2022-03-19 PROBLEM — M51.26 HNP (HERNIATED NUCLEUS PULPOSUS), LUMBAR: Status: ACTIVE | Noted: 2018-02-12

## 2022-03-20 PROBLEM — N41.9 PROSTATITIS: Status: ACTIVE | Noted: 2017-12-26

## 2022-03-20 PROBLEM — M48.061 LUMBAR FORAMINAL STENOSIS: Status: ACTIVE | Noted: 2021-05-26

## 2022-08-26 ENCOUNTER — TRANSCRIBE ORDER (OUTPATIENT)
Dept: REGISTRATION | Age: 77
End: 2022-08-26

## 2022-08-26 ENCOUNTER — HOSPITAL ENCOUNTER (OUTPATIENT)
Dept: PREADMISSION TESTING | Age: 77
Discharge: HOME | End: 2022-08-26
Payer: MEDICARE

## 2022-08-26 DIAGNOSIS — M47.26 OTHER SPONDYLOSIS WITH RADICULOPATHY, LUMBAR REGION: ICD-10-CM

## 2022-08-26 DIAGNOSIS — M54.50 LUMBAGO: ICD-10-CM

## 2022-08-26 DIAGNOSIS — M51.27 LUMBAGO-SCIATICA DUE TO DISPLACEMENT OF LUMBAR INTERVERTEBRAL DISC: ICD-10-CM

## 2022-08-26 DIAGNOSIS — M12.9 ACUTE ARTHROPATHY: Primary | ICD-10-CM

## 2022-08-26 DIAGNOSIS — M48.062 PSEUDOCLAUDICATION SYNDROME: ICD-10-CM

## 2022-08-26 DIAGNOSIS — M12.9 ACUTE ARTHROPATHY: ICD-10-CM

## 2022-08-26 PROCEDURE — 93005 ELECTROCARDIOGRAM TRACING: CPT

## 2022-08-28 LAB
ATRIAL RATE: 67 BPM
BACTERIA SPEC CULT: NORMAL
BACTERIA SPEC CULT: NORMAL
CALCULATED R AXIS, ECG10: 80 DEGREES
CALCULATED T AXIS, ECG11: -19 DEGREES
DIAGNOSIS, 93000: NORMAL
P-R INTERVAL, ECG05: 116 MS
Q-T INTERVAL, ECG07: 462 MS
QRS DURATION, ECG06: 168 MS
QTC CALCULATION (BEZET), ECG08: 488 MS
SERVICE CMNT-IMP: NORMAL
VENTRICULAR RATE, ECG03: 67 BPM

## 2022-08-30 ENCOUNTER — HOSPITAL ENCOUNTER (OUTPATIENT)
Dept: PREADMISSION TESTING | Age: 77
Discharge: HOME | End: 2022-08-30

## 2022-08-30 VITALS — BODY MASS INDEX: 33.64 KG/M2 | WEIGHT: 235 LBS | HEIGHT: 70 IN

## 2022-08-30 RX ORDER — SODIUM CHLORIDE, SODIUM LACTATE, POTASSIUM CHLORIDE, CALCIUM CHLORIDE 600; 310; 30; 20 MG/100ML; MG/100ML; MG/100ML; MG/100ML
125 INJECTION, SOLUTION INTRAVENOUS CONTINUOUS
Status: CANCELLED | OUTPATIENT
Start: 2022-08-30

## 2022-08-30 RX ORDER — CEFAZOLIN SODIUM/WATER 2 G/20 ML
2 SYRINGE (ML) INTRAVENOUS ONCE
Status: CANCELLED | OUTPATIENT
Start: 2022-08-30 | End: 2022-08-30

## 2022-08-30 RX ORDER — CYANOCOBALAMIN (VITAMIN B-12) 500 MCG
400 TABLET ORAL DAILY
COMMUNITY

## 2022-08-30 NOTE — CALL BACK NOTE
Called patient. No answer. Message left for patient to return call. Will call back in 10 minutes. @[phone] called patient-attempt 2, no answer-message left for patient to return call.

## 2022-08-30 NOTE — PERIOP NOTES
No sleep apnea, removable prosthetic devices or family history of malignant hyperthermia. Poor historian. CHG wash x 3 days instructions reviewed. PCP is aware of the surgery. No participation in clinical trial or research study. Do not bring any valuables on DOS- jewelry, wallet, cash, laptop, medications. Does meet criteria for special population and has [address] [facility] pacemaker-posting notified. Possible time delay day of surgery reviewed. Covid card- in Immunizations- to bring card on [place]. DNR status- none.

## 2022-09-02 NOTE — H&P
Patient Name:  Boyd Junior   YOB: 1945    Chief Complaint:  Right lower extremity pain, radiculopathy, and L4-5 foraminal stenosis. 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 He says his back, hips, and legs are still giving him problems, and he is still having pain. He has difficulty with bending, turning, and twisting. Standing for any length of time or walking for any length of time causes pain. He says the wound on his left leg has healed up, and he is ready for surgery on his lower back. He says his back, hips, and legs are still giving him problems, and he is still having pain. He still wants to get his surgery done at [facility]. He says he has written a letter to [facility]  to allow him to have the surgery done there. Past Medical/Surgical History:    Disease/Disorder Date Side Surgery Date Side Comment   GERD         H/O: TIA         Lacunar stroke            Appendectomy 1952        CABG   [facility] 12/20/2017 - [address]      [facility]        Cardiac pacemaker 2001        Dental implants 11/12/2020        Laminectomy 02/21/2018  DL 08/29/2019 -right, L3-4, L4-5      Ventriculoatrial shunt placement        Allergies:    Ingredient Reaction Medication Name Comment   Oxycodone   Vicodin         Current Medications:    Medication Directions   Aleve    atorvastatin 80 mg tablet    CALCIUM    desmopressin 0.2 mg tablet    melatonin    metoprolol succinate ER 25 mg tablet,extended release 24 hr    Omega-3    Plavix 75 mg tablet    sucralfate 1 gram tablet    vitamin A      Social History:      SMOKING  Status Tobacco Type Units Per Day Yrs Used   Never smoker        ALCOHOL  There is a history of alcohol use. 1 drink consumed daily.     Family History:    Disease Detail Family Member Age Cause of Death Comments   Cancer, prostate Father  Y      Review of Systems:    GENERAL:  Patient has no signs of fever or chills. or weight change  HEAD/ENTM:  Patient has no signs of headaches, dizziness, hearing loss, ringing in ears, sore throat/hoarseness, recent cold, double vision, blurred vision, itchy eyes, eye redness or eye discharge. CARDIOVASCULAR:  Patient has no signs of chest pain, palpitations, rheumatic fever or heart murmur. RESPIRATORY:  Patient has no signs of chronic cough, wheezing, difficulty breathing, pain on breathing or shortness of breath. GASTROINTESTINAL:  Patient has no signs of nausea/vomiting, difficulty swallowing, gas/bloating, indigestion, abdominal pain, diarrhea, bloody stools or hemorrhoids. GENITOURINARY:  Patient has no signs of blood in urine, painful urinating, burning sensation, bladder/kidney infection, frequent urinating or incontinence. MUSCULOSKELETAL: Patient presents with joint stiffness and joint pain. Patient has no signs of fracture/dislocation, sprain/strain, tendonitis, rheumatoid disease, gout or swelling of feet. INTEGUMENTARY:  Patient has no signs of rash/itching, psoriasis, Raynaud's phenomenon or varicose veins. EMOTIONAL:  Patient has no signs of anxiety, depression, bipolar disorder, memory loss or change in mood. Vitals:  Date BP Pulse Temp (F) Resp. (per min.) Height (Total in.) Weight (lbs.) BMI   12/28/2021     70.00 237.00 34.01   10/28/2021     70.00  34.01   04/29/2021     70.50  33.52   07/28/2020     70.50  33.52     Physical Examination:    General:  The patient appears healthy. Cardiovascular:  Arterial pulses are normal.  Skin:  The skin is normal appearing with no contusions or wounds noted. Heart- RRR  Lungs-CTA darrion  Abdomen- +BS,soft,nontender   Musculoskeletal:  There is tenderness around the right PSIS. The thoracolumbar spine has normal kyphosis, a normal appearance, and no scoliosis.   There is full range of motion of the cervical, thoracic, and lumbar spine and of the hips, knees, and ankles. Straight leg raising and crossed straight leg raising tests are negative. Neurological:  There is no weakness in the thoracic, lumbar, or sacral spine or in the lower extremities or hips. Deep tendon reflexes are present and normal bilaterally. Ankle and knee jerks are normal with no clonus. 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 Plan: We discussed treatments for the condition including surgical intervention, the risks, and benefits as well as the different surgical approaches and decision making. We also discussed nonsurgical care for this condition including medications, injections, physical therapy, rehabilitation, activity modification, and brace utilization. At this point, he would like to proceed with operative intervention. He will proceed with  right L4-5 foraminal decompression.

## 2022-09-06 RX ORDER — SODIUM CHLORIDE 0.9 % (FLUSH) 0.9 %
5-40 SYRINGE (ML) INJECTION EVERY 8 HOURS
Status: CANCELLED | OUTPATIENT
Start: 2022-09-06

## 2022-09-06 RX ORDER — SODIUM CHLORIDE 0.9 % (FLUSH) 0.9 %
5-40 SYRINGE (ML) INJECTION AS NEEDED
Status: CANCELLED | OUTPATIENT
Start: 2022-09-06

## 2022-09-09 ENCOUNTER — ANESTHESIA EVENT (OUTPATIENT)
Dept: SURGERY | Age: 77
End: 2022-09-09
Payer: MEDICARE

## 2022-09-12 ENCOUNTER — APPOINTMENT (OUTPATIENT)
Dept: GENERAL RADIOLOGY | Age: 77
End: 2022-09-12
Attending: ORTHOPAEDIC SURGERY
Payer: MEDICARE

## 2022-09-12 ENCOUNTER — ANESTHESIA (OUTPATIENT)
Dept: SURGERY | Age: 77
End: 2022-09-12
Payer: MEDICARE

## 2022-09-12 ENCOUNTER — HOSPITAL ENCOUNTER (OUTPATIENT)
Age: 77
LOS: 1 days | Discharge: HOME | End: 2022-09-12
Attending: ORTHOPAEDIC SURGERY | Admitting: ORTHOPAEDIC SURGERY
Payer: MEDICARE

## 2022-09-12 VITALS
RESPIRATION RATE: 15 BRPM | HEIGHT: 70 IN | BODY MASS INDEX: 33.76 KG/M2 | OXYGEN SATURATION: 99 % | SYSTOLIC BLOOD PRESSURE: 130 MMHG | TEMPERATURE: 97 F | DIASTOLIC BLOOD PRESSURE: 62 MMHG | WEIGHT: 235.8 LBS | HEART RATE: 65 BPM

## 2022-09-12 DIAGNOSIS — M48.062 SPINAL STENOSIS OF LUMBAR REGION WITH NEUROGENIC CLAUDICATION: ICD-10-CM

## 2022-09-12 DIAGNOSIS — M48.061 LUMBAR FORAMINAL STENOSIS: Primary | ICD-10-CM

## 2022-09-12 PROBLEM — M51.26 HNP (HERNIATED NUCLEUS PULPOSUS), LUMBAR: Status: RESOLVED | Noted: 2018-02-12 | Resolved: 2022-09-12

## 2022-09-12 PROCEDURE — 74011000250 HC RX REV CODE- 250: Performed by: ORTHOPAEDIC SURGERY

## 2022-09-12 PROCEDURE — 77030003029 HC SUT VCRL J&J -B: Performed by: ORTHOPAEDIC SURGERY

## 2022-09-12 PROCEDURE — 77030008683 HC TU ET CUF COVD -A: Performed by: ANESTHESIOLOGY

## 2022-09-12 PROCEDURE — 77030027521 HC SEAL BPLR AQMNTYS EVS MEDT -F: Performed by: ORTHOPAEDIC SURGERY

## 2022-09-12 PROCEDURE — 77030020782 HC GWN BAIR PAWS FLX 3M -B: Performed by: ORTHOPAEDIC SURGERY

## 2022-09-12 PROCEDURE — 77030027138 HC INCENT SPIROMETER -A: Performed by: ORTHOPAEDIC SURGERY

## 2022-09-12 PROCEDURE — 77030003666 HC NDL SPINAL BD -A: Performed by: ORTHOPAEDIC SURGERY

## 2022-09-12 PROCEDURE — 76060000033 HC ANESTHESIA 1 TO 1.5 HR: Performed by: ORTHOPAEDIC SURGERY

## 2022-09-12 PROCEDURE — 2709999900 HC NON-CHARGEABLE SUPPLY: Performed by: ORTHOPAEDIC SURGERY

## 2022-09-12 PROCEDURE — 74011000250 HC RX REV CODE- 250: Performed by: NURSE ANESTHETIST, CERTIFIED REGISTERED

## 2022-09-12 PROCEDURE — 77030002986 HC SUT PROL J&J -A: Performed by: ORTHOPAEDIC SURGERY

## 2022-09-12 PROCEDURE — 74011000272 HC RX REV CODE- 272: Performed by: ORTHOPAEDIC SURGERY

## 2022-09-12 PROCEDURE — 77030010507 HC ADH SKN DERMBND J&J -B: Performed by: ORTHOPAEDIC SURGERY

## 2022-09-12 PROCEDURE — 74011250636 HC RX REV CODE- 250/636: Performed by: ORTHOPAEDIC SURGERY

## 2022-09-12 PROCEDURE — 76210000063 HC OR PH I REC FIRST 0.5 HR: Performed by: ORTHOPAEDIC SURGERY

## 2022-09-12 PROCEDURE — 74011250636 HC RX REV CODE- 250/636: Performed by: NURSE ANESTHETIST, CERTIFIED REGISTERED

## 2022-09-12 PROCEDURE — 76210000021 HC REC RM PH II 0.5 TO 1 HR: Performed by: ORTHOPAEDIC SURGERY

## 2022-09-12 PROCEDURE — 77030008462 HC STPLR SKN PROX J&J -A: Performed by: ORTHOPAEDIC SURGERY

## 2022-09-12 PROCEDURE — 76010000149 HC OR TIME 1 TO 1.5 HR: Performed by: ORTHOPAEDIC SURGERY

## 2022-09-12 RX ORDER — HYDROMORPHONE HYDROCHLORIDE 1 MG/ML
0.5 INJECTION, SOLUTION INTRAMUSCULAR; INTRAVENOUS; SUBCUTANEOUS
Status: DISCONTINUED | OUTPATIENT
Start: 2022-09-12 | End: 2022-09-12 | Stop reason: HOSPADM

## 2022-09-12 RX ORDER — ROCURONIUM BROMIDE 10 MG/ML
INJECTION, SOLUTION INTRAVENOUS AS NEEDED
Status: DISCONTINUED | OUTPATIENT
Start: 2022-09-12 | End: 2022-09-12 | Stop reason: HOSPADM

## 2022-09-12 RX ORDER — GLYCOPYRROLATE 0.2 MG/ML
INJECTION INTRAMUSCULAR; INTRAVENOUS AS NEEDED
Status: DISCONTINUED | OUTPATIENT
Start: 2022-09-12 | End: 2022-09-12 | Stop reason: HOSPADM

## 2022-09-12 RX ORDER — CLOPIDOGREL BISULFATE 75 MG/1
75 TABLET ORAL DAILY
Qty: 2 TABLET | Refills: 0 | Status: SHIPPED
Start: 2022-09-12

## 2022-09-12 RX ORDER — OXYCODONE AND ACETAMINOPHEN 5; 325 MG/1; MG/1
1 TABLET ORAL
Status: DISCONTINUED | OUTPATIENT
Start: 2022-09-12 | End: 2022-09-12 | Stop reason: HOSPADM

## 2022-09-12 RX ORDER — FLUMAZENIL 0.1 MG/ML
0.2 INJECTION INTRAVENOUS
Status: DISCONTINUED | OUTPATIENT
Start: 2022-09-12 | End: 2022-09-12 | Stop reason: HOSPADM

## 2022-09-12 RX ORDER — HYDROMORPHONE HYDROCHLORIDE 2 MG/1
2-4 TABLET ORAL
Qty: 56 TABLET | Refills: 0 | Status: SHIPPED | OUTPATIENT
Start: 2022-09-12 | End: 2022-09-22

## 2022-09-12 RX ORDER — SODIUM CHLORIDE, SODIUM LACTATE, POTASSIUM CHLORIDE, CALCIUM CHLORIDE 600; 310; 30; 20 MG/100ML; MG/100ML; MG/100ML; MG/100ML
INJECTION, SOLUTION INTRAVENOUS
Status: DISCONTINUED | OUTPATIENT
Start: 2022-09-12 | End: 2022-09-12

## 2022-09-12 RX ORDER — NALOXONE HYDROCHLORIDE 0.4 MG/ML
0.4 INJECTION, SOLUTION INTRAMUSCULAR; INTRAVENOUS; SUBCUTANEOUS AS NEEDED
Status: DISCONTINUED | OUTPATIENT
Start: 2022-09-12 | End: 2022-09-12 | Stop reason: HOSPADM

## 2022-09-12 RX ORDER — LIDOCAINE HYDROCHLORIDE 20 MG/ML
INJECTION, SOLUTION EPIDURAL; INFILTRATION; INTRACAUDAL; PERINEURAL AS NEEDED
Status: DISCONTINUED | OUTPATIENT
Start: 2022-09-12 | End: 2022-09-12 | Stop reason: HOSPADM

## 2022-09-12 RX ORDER — PROPOFOL 10 MG/ML
INJECTION, EMULSION INTRAVENOUS AS NEEDED
Status: DISCONTINUED | OUTPATIENT
Start: 2022-09-12 | End: 2022-09-12 | Stop reason: HOSPADM

## 2022-09-12 RX ORDER — NEOSTIGMINE METHYLSULFATE 1 MG/ML
INJECTION, SOLUTION INTRAVENOUS AS NEEDED
Status: DISCONTINUED | OUTPATIENT
Start: 2022-09-12 | End: 2022-09-12 | Stop reason: HOSPADM

## 2022-09-12 RX ORDER — ONDANSETRON 2 MG/ML
INJECTION INTRAMUSCULAR; INTRAVENOUS AS NEEDED
Status: DISCONTINUED | OUTPATIENT
Start: 2022-09-12 | End: 2022-09-12 | Stop reason: HOSPADM

## 2022-09-12 RX ORDER — FENTANYL CITRATE 50 UG/ML
INJECTION, SOLUTION INTRAMUSCULAR; INTRAVENOUS AS NEEDED
Status: DISCONTINUED | OUTPATIENT
Start: 2022-09-12 | End: 2022-09-12 | Stop reason: HOSPADM

## 2022-09-12 RX ORDER — ONDANSETRON 2 MG/ML
4 INJECTION INTRAMUSCULAR; INTRAVENOUS ONCE
Status: DISCONTINUED | OUTPATIENT
Start: 2022-09-12 | End: 2022-09-12 | Stop reason: HOSPADM

## 2022-09-12 RX ORDER — FENTANYL CITRATE 50 UG/ML
50 INJECTION, SOLUTION INTRAMUSCULAR; INTRAVENOUS
Status: DISCONTINUED | OUTPATIENT
Start: 2022-09-12 | End: 2022-09-12 | Stop reason: HOSPADM

## 2022-09-12 RX ORDER — SODIUM CHLORIDE, SODIUM LACTATE, POTASSIUM CHLORIDE, CALCIUM CHLORIDE 600; 310; 30; 20 MG/100ML; MG/100ML; MG/100ML; MG/100ML
125 INJECTION, SOLUTION INTRAVENOUS CONTINUOUS
Status: DISCONTINUED | OUTPATIENT
Start: 2022-09-12 | End: 2022-09-12 | Stop reason: HOSPADM

## 2022-09-12 RX ORDER — SODIUM CHLORIDE, SODIUM LACTATE, POTASSIUM CHLORIDE, CALCIUM CHLORIDE 600; 310; 30; 20 MG/100ML; MG/100ML; MG/100ML; MG/100ML
100 INJECTION, SOLUTION INTRAVENOUS CONTINUOUS
Status: DISCONTINUED | OUTPATIENT
Start: 2022-09-12 | End: 2022-09-12 | Stop reason: HOSPADM

## 2022-09-12 RX ORDER — CEFAZOLIN SODIUM/WATER 2 G/20 ML
2 SYRINGE (ML) INTRAVENOUS ONCE
Status: COMPLETED | OUTPATIENT
Start: 2022-09-12 | End: 2022-09-12

## 2022-09-12 RX ADMIN — Medication 3 MG: at 08:43

## 2022-09-12 RX ADMIN — SODIUM CHLORIDE, SODIUM LACTATE, POTASSIUM CHLORIDE, AND CALCIUM CHLORIDE: 600; 310; 30; 20 INJECTION, SOLUTION INTRAVENOUS at 08:38

## 2022-09-12 RX ADMIN — GLYCOPYRROLATE 0.6 MG: 0.2 INJECTION INTRAMUSCULAR; INTRAVENOUS at 08:43

## 2022-09-12 RX ADMIN — PROPOFOL 150 MG: 10 INJECTION, EMULSION INTRAVENOUS at 07:34

## 2022-09-12 RX ADMIN — ONDANSETRON HYDROCHLORIDE 4 MG: 2 INJECTION INTRAMUSCULAR; INTRAVENOUS at 08:35

## 2022-09-12 RX ADMIN — ROCURONIUM BROMIDE 50 MG: 10 INJECTION, SOLUTION INTRAVENOUS at 07:34

## 2022-09-12 RX ADMIN — FENTANYL CITRATE 100 MCG: 50 INJECTION, SOLUTION INTRAMUSCULAR; INTRAVENOUS at 07:34

## 2022-09-12 RX ADMIN — LIDOCAINE HYDROCHLORIDE 40 MG: 20 INJECTION, SOLUTION INTRAVENOUS at 07:34

## 2022-09-12 RX ADMIN — SODIUM CHLORIDE, SODIUM LACTATE, POTASSIUM CHLORIDE, AND CALCIUM CHLORIDE 125 ML/HR: 600; 310; 30; 20 INJECTION, SOLUTION INTRAVENOUS at 06:40

## 2022-09-12 RX ADMIN — Medication 2 G: at 07:40

## 2022-09-12 NOTE — PERIOP NOTES
Patient arrived in phase 2 in a WC, patient transferred to chair, used urinal. Patent is tolerating ice chips, denies wanting liquids and crackers, call bell within reach, family at side

## 2022-09-12 NOTE — ANESTHESIA PREPROCEDURE EVALUATION
Relevant Problems   NEUROLOGY   (+) Transient ischemic attack      CARDIOVASCULAR   (+) Cardiac pacemaker in situ   (+) Complete heart block (HCC)   (+) Coronary atherosclerosis of native coronary artery   (+) S/P CABG x 5   (+) Third degree AV block (HCC)       Anesthetic History   No history of anesthetic complications            Review of Systems / Medical History  Patient summary reviewed, nursing notes reviewed and pertinent labs reviewed    Pulmonary  Within defined limits                 Neuro/Psych       CVA  TIA  Pertinent negatives: No seizures and neuromuscular disease   Cardiovascular    Hypertension: well controlled        Dysrhythmias   Pacemaker, CAD, cardiac stents and CABG  Pertinent negatives: No past MI, angina and CHF  Exercise tolerance: >4 METS  Comments: Complete heart block s/p pacemaker   GI/Hepatic/Renal  Within defined limits              Endo/Other        Obesity and arthritis  Pertinent negatives: No diabetes, hypothyroidism, hyperthyroidism and blood dyscrasia   Other Findings              Physical Exam    Airway  Mallampati: II  TM Distance: 4 - 6 cm  Neck ROM: normal range of motion   Mouth opening: Normal     Cardiovascular  Regular rate and rhythm,  S1 and S2 normal,  no murmur, click, rub, or gallop             Dental    Dentition: Implants     Pulmonary  Breath sounds clear to auscultation               Abdominal  GI exam deferred       Other Findings            Anesthetic Plan    ASA: 4  Anesthesia type: general          Induction: Intravenous  Anesthetic plan and risks discussed with: Patient      GA/OETT

## 2022-09-12 NOTE — PERIOP NOTES
TRANSFER - OUT REPORT:    Verbal report given to PILI Milligan(name) on Jada Pelayo Dr.  being transferred to phase two(unit) for routine progression of care       Report consisted of patients Situation, Background, Assessment and   Recommendations(SBAR). Information from the following report(s) OR Summary was reviewed with the receiving nurse. Lines:   Peripheral IV 09/12/22 Right; Outer Hand (Active)   Site Assessment Clean, dry, & intact 09/12/22 0946   Phlebitis Assessment 0 09/12/22 0946   Infiltration Assessment 0 09/12/22 0946   Dressing Status Clean, dry, & intact 09/12/22 0946   Dressing Type Tape;Transparent 09/12/22 0946   Hub Color/Line Status Pink; Infusing;Patent 09/12/22 0946   Alcohol Cap Used No 09/12/22 1426        Opportunity for questions and clarification was provided.       Patient transported with:   O2 @ 0 liters

## 2022-09-12 NOTE — OP NOTES
Patient: Salvador Alicea Dr. MRN: [id number]  SSN: [id number]    YOB: 1945  Age: 64 y.o. Sex: male      Date of Procedure: 9/12/2022   Preoperative Diagnosis: LUMBAR SPONDYLOSIS WITH RADICULOPATHY, LUMBAR STENOSIS, FACET ARTHROSIS, LUMBOSACRAL  HNP WITHOUT MYELOPATHY  Postoperative Diagnosis: LUMBAR SPONDYLOSIS WITH RADICULOPATHY, LUMBAR STENOSIS, FACET ARTHROSIS, LUMBOSACRAL  HNP WITHOUT MYELOPATHY    Procedure: Procedure(s):  RIGHT LUMBAR 4-5 LAMINOTOMY AND FORAMINOTOMY WITH C-ARM \"SPEC POP\" PT HAS [facility]  Surgeon(s) and Role:     Marina Parry MD - Primary  Assistant: Donald Obando PA-C  OR Assitance: Physician Assistant: THEE Hidalgo  Surg Asst-1: Na FRAGOSO  Anesthesia: General   Estimated Blood Loss: 50cc  Specimens: * No specimens in log *   Findings: HNP   Complications: none      Indications: This is a 64y.o. year-old male who presents with significant right lower extremity pain, worsening ability to do activities of daily living. X-rays and MRI scan revealing disc protrusion and foraminal stenosis, and degenerative disk disease. Having having failed conservative care now comes for operative intervention. DESCRIPTION OF PROCEDURE: After correct identification, the patient was   taken to the operating room, placed supine on the table, general   endotracheal anesthesia induced. The patient was then rotated onto the Jose D frame and prepped with DuraPrep. 2grams of Ancef was given. Midline incision was made in the lumbar spine, taken down to the spinous processes of L4-5. The posterior lamina of L4-5 was exposed. Parth retractor was then placed. The wound was then irrigated. Laminectomy was then performed of the inferior aspect of L4 as well as the superior aspect of L5. This provided excellent visualization of the dura. The nerve root was then mobilized medially and held with a nerve root retractor. The disc was noted to be protuberant. Intra-operatively the neuroforamen was determined to be compressing the nerve root. Foraminotomy was performed at this level to ensure complete decompression of the nerve root. Bleeding controlled with bipolar electrocautery. The wound was then irrigated. Fascia was then closed using #1 Vicryl suture. Subcutaneous tissue   approximated with 2-0 Vicryl suture. Skin approximated with 3-0 PDS suture and dermabond was appled. Sterile dressing was applied. The patient tolerated the procedure well and taken to Recovery room in good   condition. Assistant surgeon was needed throughout the procedure for managing bleeding, improving visualization and closure of the surgical wounds.

## 2022-09-12 NOTE — PERIOP NOTES
Reviewed PTA medication list with patient/caregiver and patient/caregiver denies any additional medications. Patient admits to having a responsible adult care for them at home for at least 24 hours after surgery. Patient encouraged to use gown warming system and informed that using said warming gown to regulate body temperature prior to a procedure has been shown to help reduce the risks of blood clots and infection. Patient's pharmacy of choice verified and documented in PTA medication section. Dual skin assessment & fall risk band verification completed with Chen Pinedaan.

## 2022-09-12 NOTE — DISCHARGE INSTRUCTIONS
WBAT. Change dressing in 3 days. Do not get incision wet x 5 days. No lifting over 20 pounds. Take stool softeners daily while taking pain medications, since pain medicines are constipating. Restart Plavix tomorrow- Tuesday Sept 13,2022. DISCHARGE SUMMARY from Nurse    PATIENT INSTRUCTIONS:    After general anesthesia or intravenous sedation, for 24 hours or while taking prescription Narcotics:  Limit your activities  Do not drive and operate hazardous machinery  Do not make important personal or business decisions  Do  not drink alcoholic beverages  If you have not urinated within 8 hours after discharge, please contact your surgeon on call. Report the following to your surgeon:  Excessive pain, swelling, redness or odor of or around the surgical area  Temperature over 100.5  Nausea and vomiting lasting longer than 4 hours or if unable to take medications  Any signs of decreased circulation or nerve impairment to extremity: change in color, persistent  numbness, tingling, coldness or increase pain  Any questions    What to do at Home:  Recommended activity: Ambulate in house and No lifting, Driving, or Strenuous exercise until advised      *  Please give a list of your current medications to your Primary Care Provider. *  Please update this list whenever your medications are discontinued, doses are      changed, or new medications (including over-the-counter products) are added. *  Please carry medication information at all times in case of emergency situations. These are general instructions for a healthy lifestyle:    No smoking/ No tobacco products/ Avoid exposure to second hand smoke  Surgeon General's Warning:  Quitting smoking now greatly reduces serious risk to your health.     Obesity, smoking, and sedentary lifestyle greatly increases your risk for illness    A healthy diet, regular physical exercise & weight monitoring are important for maintaining a healthy lifestyle    You may be retaining fluid if you have a history of heart failure or if you experience any of the following symptoms:  Weight gain of 3 pounds or more overnight or 5 pounds in a week, increased swelling in our hands or feet or shortness of breath while lying flat in bed. Please call your doctor as soon as you notice any of these symptoms; do not wait until your next office visit. Patient armband removed and shredded     The discharge information has been reviewed with the patient and caregiver. The patient and caregiver verbalized understanding. Discharge medications reviewed with the patient and caregiver and appropriate educational materials and side effects teaching were provided.   ___________________________________________________________________________________________________________________________________

## 2022-09-12 NOTE — INTERVAL H&P NOTE
Update History & Physical    The Patient's History and Physical was reviewed with the patient and I examined the patient. There was no change. The surgical site was confirmed by the patient and me. Plan:  The risk, benefits, expected outcome, and alternative to the recommended procedure have been discussed with the patient. Patient understands and wants to proceed with the procedure.     Electronically signed by Murphy Villagomez MD on 9/12/2022 at 7:14 AM

## 2022-09-12 NOTE — PERIOP NOTES
TRANSFER - IN REPORT:    Verbal report received from Reema Hernandez RN(name) on Jada Pelayo Dr.  being received from [facility]) for routine progression of care      Report consisted of patients Situation, Background, Assessment and   Recommendations(SBAR). Information from the following report(s) SBAR, Procedure Summary, Intake/Output, and MAR was reviewed with the receiving nurse. Opportunity for questions and clarification was provided. Assessment completed upon patients arrival to unit and care assumed.

## 2022-09-12 NOTE — PERIOP NOTES
TRANSFER - IN REPORT:    Verbal report received from Nayan Mancia RN(name) on Debbie Garay Dr.  being received from [facility](unit) for routine progression of care      Report consisted of patients Situation, Background, Assessment and   Recommendations(SBAR). Information from the following report(s) OR Summary was reviewed with the receiving nurse. Opportunity for questions and clarification was provided. Assessment completed upon patients arrival to unit and care assumed.

## 2022-09-12 NOTE — ANESTHESIA POSTPROCEDURE EVALUATION
Post-Anesthesia Evaluation & Assessment    Visit Vitals  BP (!) 146/83   Pulse 77   Temp 36.4 °C (97.5 °F)   Resp 13   Ht 5' 10\" (1.778 m)   Wt 107 kg (235 lb 12.8 oz)   SpO2 95%   BMI 33.83 kg/m²       Nausea/Vomiting: Controlled. Post-operative hydration adequate. Pain Scale 1: Visual (cannot verblize a number) (09/12/22 0912)  Pain Intensity 1: 0 (09/12/22 0912)   Managed    Pain score at or below stated goal level. Mental status & Level of consciousness: alert and oriented x 3    Neurological status: moves all extremities, sensation grossly intact    Pulmonary status: airway patent, adequate oxygenation. Complications related to anesthesia: none    Patient has met all PACU discharge requirements.       Lalit Maldonado DO

## 2022-09-12 NOTE — PERIOP NOTES
Intake/Output Summary (Last 24 hours) at 9/12/2022 0924  Last data filed at 9/12/2022 0909  Gross per 24 hour   Intake 1400 ml   Output 250 ml   Net 1150 ml

## 2024-07-29 ENCOUNTER — HOSPITAL ENCOUNTER (OUTPATIENT)
Facility: HOSPITAL | Age: 79
Discharge: HOME | End: 2024-08-01
Attending: SURGERY
Payer: MEDICARE

## 2024-07-29 DIAGNOSIS — I73.9 PERIPHERAL VASCULAR DISEASE, UNSPECIFIED (HCC): ICD-10-CM

## 2024-07-29 DIAGNOSIS — I70.222 ATHEROSCLEROSIS OF NATIVE ARTERY OF LEFT LOWER EXTREMITY WITH REST PAIN (HCC): ICD-10-CM

## 2024-07-29 LAB — CREAT BLD-MCNC: 1.1 MG/DL (ref 0.6–1.3)

## 2024-07-29 PROCEDURE — 82565 ASSAY OF CREATININE: CPT

## 2024-07-29 PROCEDURE — 75635 CT ANGIO ABDOMINAL ARTERIES: CPT

## 2024-07-29 PROCEDURE — 6360000004 HC RX CONTRAST MEDICATION: Performed by: SURGERY

## 2024-07-29 RX ADMIN — IOPAMIDOL 100 ML: 755 INJECTION, SOLUTION INTRAVENOUS at 15:07

## (undated) DEVICE — SUTURE PROL SZ 3-0 L18IN NONABSORBABLE BLU L19MM PC-5 3/8 8632G

## (undated) DEVICE — 3M™ TEGADERM™ TRANSPARENT FILM DRESSING FRAME STYLE, 1626, 4 IN X 4-3/4 IN (10 CM X 12 CM), 50/CT 4CT/CASE: Brand: 3M™ TEGADERM™

## (undated) DEVICE — PAD,ABDOMINAL,5"X9",STERILE,LF,1/PK: Brand: MEDLINE INDUSTRIES, INC.

## (undated) DEVICE — PREP SKN PREVAIL 40ML APPL --

## (undated) DEVICE — (D)ADHESIVE TISS HI VISC 1ML -- DISC USE ITEM 346585

## (undated) DEVICE — STERILE POLYISOPRENE POWDER-FREE SURGICAL GLOVES: Brand: PROTEXIS

## (undated) DEVICE — SINGLE PORT MANIFOLD: Brand: NEPTUNE 2

## (undated) DEVICE — SPONGE LAP 18X18IN STRL -- 5/PK

## (undated) DEVICE — GLOVE SURG SZ 65 THK91MIL LTX FREE SYN POLYISOPRENE

## (undated) DEVICE — ROUND DISSECTORS: Brand: DEROYAL

## (undated) DEVICE — GOWN,SIRUS,FABRNF,XL,20/CS: Brand: MEDLINE

## (undated) DEVICE — WILSON FRAME STYLE POSITIONING KITS - 	FRAME PAD SLEEVES (SET OF 2) , DRAPE PROTECTOR, BAR PROTECTOR 7" COMFORT FOAM HEADREST, LAMINECTOMY ARM CRADLES: Brand: SOULE MEDICAL

## (undated) DEVICE — REM POLYHESIVE ADULT PATIENT RETURN ELECTRODE: Brand: VALLEYLAB

## (undated) DEVICE — SOL IRRIGATION INJ NACL 0.9% 500ML BTL

## (undated) DEVICE — Device

## (undated) DEVICE — BIPOLAR SEALER 23-314-1 AQM MINI EVS 3.4: Brand: AQUAMANTYS™

## (undated) DEVICE — DRESSING PETRO W3XL8IN N ADH OIL EMUL GZ CURAD

## (undated) DEVICE — MEDI-VAC SUCTION HANDLE REGULAR CAPACITY: Brand: CARDINAL HEALTH

## (undated) DEVICE — KENDALL SCD EXPRESS SLEEVES, KNEE LENGTH, MEDIUM: Brand: KENDALL SCD

## (undated) DEVICE — NDL SPNE QNCKE 18GX3.5IN LF --

## (undated) DEVICE — SUTURE VCRL + SZ 2-0 L18IN ABSRB VLT CT-2 1/2 CIR TAPERCUT VCP726D

## (undated) DEVICE — SUT VCRL + 0 36IN UR6 VIO --

## (undated) DEVICE — GLOVE ORANGE PI 8 1/2   MSG9085

## (undated) DEVICE — 1010 S-DRAPE TOWEL DRAPE 10/BX: Brand: STERI-DRAPE™

## (undated) DEVICE — GLOVE ORANGE PI 7   MSG9070

## (undated) DEVICE — PACK PROCEDURE SURG LAMINECTOMY SPINE CUST

## (undated) DEVICE — DERMABOND SKIN ADH 0.7ML --

## (undated) DEVICE — FLOSEAL MATRIX IS INDICATED IN SURGICAL PROCEDURES (OTHER THAN IN OPHTHALMIC) AS AN ADJUNCT TO HEMOSTASIS WHEN CONTROL OF BLEEDING BY LIGATURE OR CONVENTIONAL PROCEDURES IS INEFFECTIVE OR IMPRACTICAL.: Brand: FLOSEAL HEMOSTATIC MATRIX

## (undated) DEVICE — 3M™ TEGADERM™ TRANSPARENT FILM DRESSING FRAME STYLE, 1627, 4 IN X 10 IN (10 CM X 25 CM), 20/CT 4CT/CASE: Brand: 3M™ TEGADERM™

## (undated) DEVICE — GLOVE ORANGE PI 8   MSG9080

## (undated) DEVICE — SYR 50ML LR LCK 1ML GRAD NSAF --